# Patient Record
Sex: FEMALE | Race: WHITE | NOT HISPANIC OR LATINO | Employment: UNEMPLOYED | URBAN - METROPOLITAN AREA
[De-identification: names, ages, dates, MRNs, and addresses within clinical notes are randomized per-mention and may not be internally consistent; named-entity substitution may affect disease eponyms.]

---

## 2024-09-12 ENCOUNTER — LAB (OUTPATIENT)
Dept: INTERNAL MEDICINE | Facility: CLINIC | Age: 54
End: 2024-09-12
Payer: COMMERCIAL

## 2024-09-12 ENCOUNTER — OFFICE VISIT (OUTPATIENT)
Dept: INTERNAL MEDICINE | Facility: CLINIC | Age: 54
End: 2024-09-12
Payer: COMMERCIAL

## 2024-09-12 VITALS
TEMPERATURE: 97.8 F | SYSTOLIC BLOOD PRESSURE: 122 MMHG | HEART RATE: 72 BPM | HEIGHT: 62 IN | DIASTOLIC BLOOD PRESSURE: 68 MMHG | BODY MASS INDEX: 19.32 KG/M2 | WEIGHT: 105 LBS | RESPIRATION RATE: 16 BRPM | OXYGEN SATURATION: 99 %

## 2024-09-12 DIAGNOSIS — Z00.00 PHYSICAL EXAM: Primary | ICD-10-CM

## 2024-09-12 DIAGNOSIS — R22.0 HEAD MASS: ICD-10-CM

## 2024-09-12 DIAGNOSIS — J30.2 SEASONAL ALLERGIES: ICD-10-CM

## 2024-09-12 DIAGNOSIS — Z12.31 ENCOUNTER FOR SCREENING MAMMOGRAM FOR MALIGNANT NEOPLASM OF BREAST: ICD-10-CM

## 2024-09-12 DIAGNOSIS — R63.4 UNINTENTIONAL WEIGHT LOSS: ICD-10-CM

## 2024-09-12 DIAGNOSIS — Z00.00 ANNUAL PHYSICAL EXAM: Primary | ICD-10-CM

## 2024-09-12 DIAGNOSIS — Z11.59 NEED FOR HEPATITIS C SCREENING TEST: ICD-10-CM

## 2024-09-12 DIAGNOSIS — F33.1 MAJOR DEPRESSIVE DISORDER, RECURRENT, MODERATE: ICD-10-CM

## 2024-09-12 DIAGNOSIS — Z11.51 SCREENING FOR HPV (HUMAN PAPILLOMAVIRUS): ICD-10-CM

## 2024-09-12 DIAGNOSIS — Z12.12 SCREENING FOR COLORECTAL CANCER: ICD-10-CM

## 2024-09-12 DIAGNOSIS — E78.2 MIXED HYPERLIPIDEMIA: ICD-10-CM

## 2024-09-12 DIAGNOSIS — Z12.11 SCREENING FOR COLORECTAL CANCER: ICD-10-CM

## 2024-09-12 DIAGNOSIS — E55.9 VITAMIN D DEFICIENCY: ICD-10-CM

## 2024-09-12 PROCEDURE — 99386 PREV VISIT NEW AGE 40-64: CPT | Performed by: INTERNAL MEDICINE

## 2024-09-12 PROCEDURE — 86803 HEPATITIS C AB TEST: CPT | Performed by: INTERNAL MEDICINE

## 2024-09-12 PROCEDURE — 82607 VITAMIN B-12: CPT | Performed by: INTERNAL MEDICINE

## 2024-09-12 PROCEDURE — 83036 HEMOGLOBIN GLYCOSYLATED A1C: CPT | Performed by: INTERNAL MEDICINE

## 2024-09-12 PROCEDURE — 36415 COLL VENOUS BLD VENIPUNCTURE: CPT | Performed by: INTERNAL MEDICINE

## 2024-09-12 PROCEDURE — 80050 GENERAL HEALTH PANEL: CPT | Performed by: INTERNAL MEDICINE

## 2024-09-12 PROCEDURE — 81001 URINALYSIS AUTO W/SCOPE: CPT | Performed by: INTERNAL MEDICINE

## 2024-09-12 PROCEDURE — 80061 LIPID PANEL: CPT | Performed by: INTERNAL MEDICINE

## 2024-09-12 PROCEDURE — 82306 VITAMIN D 25 HYDROXY: CPT | Performed by: INTERNAL MEDICINE

## 2024-09-12 PROCEDURE — G0432 EIA HIV-1/HIV-2 SCREEN: HCPCS | Performed by: INTERNAL MEDICINE

## 2024-09-12 RX ORDER — MIRTAZAPINE 7.5 MG/1
7.5 TABLET, FILM COATED ORAL NIGHTLY
Start: 2024-09-12

## 2024-09-12 RX ORDER — CLONIDINE HYDROCHLORIDE 0.1 MG/1
0.1 TABLET ORAL 2 TIMES DAILY
COMMUNITY
End: 2024-09-12

## 2024-09-12 RX ORDER — MIRTAZAPINE 15 MG/1
15 TABLET, FILM COATED ORAL NIGHTLY
COMMUNITY
End: 2024-09-12

## 2024-09-12 RX ORDER — MONTELUKAST SODIUM 10 MG/1
10 TABLET ORAL NIGHTLY
Qty: 30 TABLET | Refills: 5 | Status: SHIPPED | OUTPATIENT
Start: 2024-09-12

## 2024-09-12 RX ORDER — ERGOCALCIFEROL 1.25 MG/1
50000 CAPSULE, LIQUID FILLED ORAL WEEKLY
Start: 2024-09-12
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
## 2024-09-13 ENCOUNTER — PATIENT ROUNDING (BHMG ONLY) (OUTPATIENT)
Dept: INTERNAL MEDICINE | Facility: CLINIC | Age: 54
End: 2024-09-13
Payer: COMMERCIAL

## 2024-09-13 LAB
25(OH)D3 SERPL-MCNC: 47.6 NG/ML (ref 30–100)
ALBUMIN SERPL-MCNC: 4.2 G/DL (ref 3.5–5.2)
ALBUMIN/GLOB SERPL: 1.6 G/DL
ALP SERPL-CCNC: 162 U/L (ref 39–117)
ALT SERPL W P-5'-P-CCNC: 48 U/L (ref 1–33)
ANION GAP SERPL CALCULATED.3IONS-SCNC: 9.5 MMOL/L (ref 5–15)
AST SERPL-CCNC: 31 U/L (ref 1–32)
BACTERIA UR QL AUTO: NORMAL /HPF
BASOPHILS # BLD AUTO: 0.07 10*3/MM3 (ref 0–0.2)
BASOPHILS NFR BLD AUTO: 1.2 % (ref 0–1.5)
BILIRUB SERPL-MCNC: 0.2 MG/DL (ref 0–1.2)
BILIRUB UR QL STRIP: NEGATIVE
BUN SERPL-MCNC: 18 MG/DL (ref 6–20)
BUN/CREAT SERPL: 21.7 (ref 7–25)
CALCIUM SPEC-SCNC: 9.8 MG/DL (ref 8.6–10.5)
CHLORIDE SERPL-SCNC: 103 MMOL/L (ref 98–107)
CHOLEST SERPL-MCNC: 214 MG/DL (ref 0–200)
CLARITY UR: CLEAR
CO2 SERPL-SCNC: 24.5 MMOL/L (ref 22–29)
COLOR UR: YELLOW
CREAT SERPL-MCNC: 0.83 MG/DL (ref 0.57–1)
DEPRECATED RDW RBC AUTO: 44.4 FL (ref 37–54)
EGFRCR SERPLBLD CKD-EPI 2021: 83.4 ML/MIN/1.73
EOSINOPHIL # BLD AUTO: 0.19 10*3/MM3 (ref 0–0.4)
EOSINOPHIL NFR BLD AUTO: 3.3 % (ref 0.3–6.2)
ERYTHROCYTE [DISTWIDTH] IN BLOOD BY AUTOMATED COUNT: 13.1 % (ref 12.3–15.4)
GLOBULIN UR ELPH-MCNC: 2.6 GM/DL
GLUCOSE SERPL-MCNC: 75 MG/DL (ref 65–99)
GLUCOSE UR STRIP-MCNC: NEGATIVE MG/DL
HBA1C MFR BLD: 4.9 % (ref 4.8–5.6)
HCT VFR BLD AUTO: 42.3 % (ref 34–46.6)
HCV AB SER QL: NORMAL
HDLC SERPL-MCNC: 94 MG/DL (ref 40–60)
HGB BLD-MCNC: 13.1 G/DL (ref 12–15.9)
HGB UR QL STRIP.AUTO: NEGATIVE
HIV 1+2 AB+HIV1 P24 AG SERPL QL IA: NORMAL
HYALINE CASTS UR QL AUTO: NORMAL /LPF
IMM GRANULOCYTES # BLD AUTO: 0.01 10*3/MM3 (ref 0–0.05)
IMM GRANULOCYTES NFR BLD AUTO: 0.2 % (ref 0–0.5)
KETONES UR QL STRIP: NEGATIVE
LDLC SERPL CALC-MCNC: 109 MG/DL (ref 0–100)
LDLC/HDLC SERPL: 1.14 {RATIO}
LEUKOCYTE ESTERASE UR QL STRIP.AUTO: NEGATIVE
LYMPHOCYTES # BLD AUTO: 1.23 10*3/MM3 (ref 0.7–3.1)
LYMPHOCYTES NFR BLD AUTO: 21.4 % (ref 19.6–45.3)
MCH RBC QN AUTO: 28.9 PG (ref 26.6–33)
MCHC RBC AUTO-ENTMCNC: 31 G/DL (ref 31.5–35.7)
MCV RBC AUTO: 93.2 FL (ref 79–97)
MONOCYTES # BLD AUTO: 0.47 10*3/MM3 (ref 0.1–0.9)
MONOCYTES NFR BLD AUTO: 8.2 % (ref 5–12)
NEUTROPHILS NFR BLD AUTO: 3.77 10*3/MM3 (ref 1.7–7)
NEUTROPHILS NFR BLD AUTO: 65.7 % (ref 42.7–76)
NITRITE UR QL STRIP: NEGATIVE
NRBC BLD AUTO-RTO: 0 /100 WBC (ref 0–0.2)
PH UR STRIP.AUTO: 6.5 [PH] (ref 5–8)
PLATELET # BLD AUTO: 278 10*3/MM3 (ref 140–450)
PMV BLD AUTO: 10.9 FL (ref 6–12)
POTASSIUM SERPL-SCNC: 5 MMOL/L (ref 3.5–5.2)
PROT SERPL-MCNC: 6.8 G/DL (ref 6–8.5)
PROT UR QL STRIP: NEGATIVE
RBC # BLD AUTO: 4.54 10*6/MM3 (ref 3.77–5.28)
RBC # UR STRIP: NORMAL /HPF
REF LAB TEST METHOD: NORMAL
SODIUM SERPL-SCNC: 137 MMOL/L (ref 136–145)
SP GR UR STRIP: 1.02 (ref 1–1.03)
SQUAMOUS #/AREA URNS HPF: NORMAL /HPF
TRIGL SERPL-MCNC: 63 MG/DL (ref 0–150)
TSH SERPL DL<=0.05 MIU/L-ACNC: 2.13 UIU/ML (ref 0.27–4.2)
UROBILINOGEN UR QL STRIP: NORMAL
VIT B12 BLD-MCNC: 517 PG/ML (ref 211–946)
VLDLC SERPL-MCNC: 11 MG/DL (ref 5–40)
WBC # UR STRIP: NORMAL /HPF
WBC NRBC COR # BLD AUTO: 5.74 10*3/MM3 (ref 3.4–10.8)

## 2024-09-13 RX ORDER — CHLORAL HYDRATE 500 MG
2000 CAPSULE ORAL 2 TIMES DAILY WITH MEALS
Qty: 120 CAPSULE | Refills: 3 | Status: SHIPPED | OUTPATIENT
Start: 2024-09-13

## 2024-09-13 NOTE — PROGRESS NOTES
A  my chart message has been sent to the patient for patient rounding with [facility].

## 2024-09-26 ENCOUNTER — TELEPHONE (OUTPATIENT)
Dept: INTERNAL MEDICINE | Facility: CLINIC | Age: 54
End: 2024-09-26
Payer: COMMERCIAL

## 2024-09-30 ENCOUNTER — TELEPHONE (OUTPATIENT)
Dept: INTERNAL MEDICINE | Facility: CLINIC | Age: 54
End: 2024-09-30
Payer: COMMERCIAL

## 2024-09-30 DIAGNOSIS — R05.3 CHRONIC COUGH: Primary | ICD-10-CM

## 2024-09-30 NOTE — TELEPHONE ENCOUNTER
Patient's sister called back, message regarding the CT scans and x-ray was relayed. She has a question regarding guardianship paperwork, she needs a physician to fill out a portion and she wanted to know if Dr Bolanos could help with that. Please advise.

## 2024-09-30 NOTE — TELEPHONE ENCOUNTER
Vane with [employer] called to let provider know that all 3 CTs were denied for different reasons. The denial letters were scanned into her chart. She needs to know if its okay to cancel the sandra scheduled on 10/4.

## 2024-10-03 RX ORDER — SODIUM PICOSULFATE, MAGNESIUM OXIDE, AND ANHYDROUS CITRIC ACID 12; 3.5; 1 G/175ML; G/175ML; MG/175ML
350 LIQUID ORAL TAKE AS DIRECTED
Qty: 350 ML | Refills: 0 | Status: SHIPPED | OUTPATIENT
Start: 2024-10-03 | End: 2024-10-04

## 2024-10-10 ENCOUNTER — HOSPITAL ENCOUNTER (OUTPATIENT)
Dept: MAMMOGRAPHY | Facility: HOSPITAL | Age: 54
Discharge: HOME | End: 2024-10-10
Admitting: INTERNAL MEDICINE
Payer: COMMERCIAL

## 2024-10-10 ENCOUNTER — LAB (OUTPATIENT)
Dept: INTERNAL MEDICINE | Facility: CLINIC | Age: 54
End: 2024-10-10
Payer: COMMERCIAL

## 2024-10-10 ENCOUNTER — OFFICE VISIT (OUTPATIENT)
Dept: INTERNAL MEDICINE | Facility: CLINIC | Age: 54
End: 2024-10-10
Payer: COMMERCIAL

## 2024-10-10 VITALS
BODY MASS INDEX: 22.63 KG/M2 | OXYGEN SATURATION: 99 % | RESPIRATION RATE: 16 BRPM | HEART RATE: 84 BPM | TEMPERATURE: 97.6 F | WEIGHT: 123 LBS | DIASTOLIC BLOOD PRESSURE: 78 MMHG | HEIGHT: 62 IN | SYSTOLIC BLOOD PRESSURE: 126 MMHG

## 2024-10-10 DIAGNOSIS — R74.8 ELEVATED LIVER ENZYMES: ICD-10-CM

## 2024-10-10 DIAGNOSIS — F33.1 MAJOR DEPRESSIVE DISORDER, RECURRENT, MODERATE: ICD-10-CM

## 2024-10-10 DIAGNOSIS — K21.9 GASTROESOPHAGEAL REFLUX DISEASE WITHOUT ESOPHAGITIS: ICD-10-CM

## 2024-10-10 DIAGNOSIS — E44.0 MODERATE PROTEIN-CALORIE MALNUTRITION: Primary | ICD-10-CM

## 2024-10-10 LAB
NCCN CRITERIA FLAG: NORMAL
TYRER CUZICK SCORE: 4.6

## 2024-10-10 PROCEDURE — 90471 IMMUNIZATION ADMIN: CPT | Performed by: INTERNAL MEDICINE

## 2024-10-10 PROCEDURE — 80053 COMPREHEN METABOLIC PANEL: CPT | Performed by: INTERNAL MEDICINE

## 2024-10-10 PROCEDURE — 90656 IIV3 VACC NO PRSV 0.5 ML IM: CPT | Performed by: INTERNAL MEDICINE

## 2024-10-10 PROCEDURE — 77063 BREAST TOMOSYNTHESIS BI: CPT

## 2024-10-10 PROCEDURE — 99214 OFFICE O/P EST MOD 30 MIN: CPT | Performed by: INTERNAL MEDICINE

## 2024-10-10 PROCEDURE — 77067 SCR MAMMO BI INCL CAD: CPT

## 2024-10-10 RX ORDER — FAMOTIDINE 20 MG/1
20 TABLET, FILM COATED ORAL 2 TIMES DAILY PRN
Qty: 60 TABLET | Refills: 3 | Status: SHIPPED | OUTPATIENT
Start: 2024-10-10

## 2024-10-10 NOTE — PROGRESS NOTES
Follow Up Office Visit      Date: 10/10/2024   Patient Name: Dali Batista  : 1970   MRN: [id number]     Chief Complaint:    Chief Complaint   Patient presents with    Follow-up       History of Present Illness: Dali Batista is a 54 y.o. female who is here today to follow up with moderate protein calorie malnutrition.  She has now been on a better diet and has gained a significant amount of weight back.  She also has soft tissue mass on the back of her head.  She was referred to general surgery for this.      Subjective      Past Medical History:   Diagnosis Date    Depression        Past Surgical History:   Procedure Laterality Date     SECTION         Family History   Problem Relation Age of Onset    Diabetes Mother     Hypertension Mother     Hyperlipidemia Mother     Osteoporosis Mother     Stroke Mother     Heart attack Father     Hypertension Father     Thyroid disease Maternal Grandmother     Cancer Paternal Grandmother         Social History     Socioeconomic History    Marital status: Single   Tobacco Use    Smoking status: Never    Smokeless tobacco: Never   Vaping Use    Vaping status: Never Used   Substance and Sexual Activity    Alcohol use: Not Currently    Drug use: Never    Sexual activity: Defer         I have reviewed the patients family history, social history, past medical history, past surgical history and have updated it as appropriate.     Medications:     Current Outpatient Medications:     LORATADINE ALLERGY RELIEF PO, Take 10 mg by mouth Daily., Disp: , Rfl:     montelukast (SINGULAIR) 10 MG tablet, Take 1 tablet by mouth Every Night., Disp: 30 tablet, Rfl: 5    Omega-3 Fatty Acids (fish oil) 1000 MG capsule capsule, Take 2 capsules by mouth 2 (Two) Times a Day With Meals., Disp: 120 capsule, Rfl: 3    sertraline (ZOLOFT) 50 MG tablet, Take 1 tablet by mouth Daily., Disp: , Rfl:     vitamin D (ERGOCALCIFEROL) 1.25 MG (67823 UT) capsule capsule, Take 1 capsule by  "mouth 1 (One) Time Per Week., Disp: , Rfl:     famotidine (Pepcid) 20 MG tablet, Take 1 tablet by mouth 2 (Two) Times a Day As Needed for Heartburn., Disp: 60 tablet, Rfl: 3    Allergies:   No Known Allergies    Objective       Vital Signs:   Vitals:    10/10/24 0931   BP: 126/78   BP Location: Left arm   Patient Position: Sitting   Cuff Size: Adult   Pulse: 84   Resp: 16   Temp: 97.6 °F (36.4 °C)   SpO2: 99%   Weight: 55.8 kg (123 lb)   Height: 157.5 cm (62\")     Body mass index is 22.5 kg/m².    Physical Exam:  Physical Exam  Constitutional:       General: She is not in acute distress.     Appearance: Normal appearance. She is not ill-appearing.   HENT:      Nose: No congestion or rhinorrhea.      Mouth/Throat:      Mouth: Mucous membranes are moist.      Pharynx: No oropharyngeal exudate.   Eyes:      Extraocular Movements: Extraocular movements intact.      Conjunctiva/sclera: Conjunctivae normal.      Pupils: Pupils are equal, round, and reactive to light.   Cardiovascular:      Rate and Rhythm: Normal rate and regular rhythm.   Pulmonary:      Effort: Pulmonary effort is normal. No respiratory distress.      Breath sounds: Normal breath sounds.   Abdominal:      General: Abdomen is flat. Bowel sounds are normal.      Palpations: Abdomen is soft.      Tenderness: There is no abdominal tenderness.   Musculoskeletal:         General: No swelling.      Cervical back: Neck supple.      Right lower leg: No edema.      Left lower leg: No edema.   Skin:     Coloration: Skin is not jaundiced.      Findings: No rash.   Neurological:      General: No focal deficit present.      Mental Status: She is alert and oriented to person, place, and time.      Cranial Nerves: No cranial nerve deficit.   Psychiatric:         Mood and Affect: Mood normal.         Behavior: Behavior normal.         Thought Content: Thought content normal.         Procedures    Results:       Assessment / Plan      Assessment/Plan:   Diagnoses and all " orders for this visit:    1. Moderate protein-calorie malnutrition (Primary)    2. Major depressive disorder, recurrent, moderate    3. Elevated liver enzymes  -     Comprehensive metabolic panel    4. Gastroesophageal reflux disease without esophagitis  -     famotidine (Pepcid) 20 MG tablet; Take 1 tablet by mouth 2 (Two) Times a Day As Needed for Heartburn.  Dispense: 60 tablet; Refill: 3    Other orders  -     Fluzone >6mos (3131-3266)       Continue sertraline for depression. She has been binge eating.  Stop mirtazapine.      Has general surgery referral on 15th at 8:45 for superficial head mass      Recheck liver enzymes          BMI is within normal parameters. No other follow-up for BMI required.       Follow Up:   Return in about 6 months (around 4/10/2025) for Recheck.    DO SHAMAR Aviles

## 2024-10-11 ENCOUNTER — HOSPITAL ENCOUNTER (OUTPATIENT)
Dept: GENERAL RADIOLOGY | Facility: HOSPITAL | Age: 54
Discharge: HOME | End: 2024-10-11
Payer: COMMERCIAL

## 2024-10-11 DIAGNOSIS — R05.3 CHRONIC COUGH: ICD-10-CM

## 2024-10-11 LAB
ALBUMIN SERPL-MCNC: 4 G/DL (ref 3.5–5.2)
ALBUMIN/GLOB SERPL: 1.4 G/DL
ALP SERPL-CCNC: 157 U/L (ref 39–117)
ALT SERPL W P-5'-P-CCNC: 20 U/L (ref 1–33)
ANION GAP SERPL CALCULATED.3IONS-SCNC: 9 MMOL/L (ref 5–15)
AST SERPL-CCNC: 23 U/L (ref 1–32)
BILIRUB SERPL-MCNC: 0.3 MG/DL (ref 0–1.2)
BUN SERPL-MCNC: 23 MG/DL (ref 6–20)
BUN/CREAT SERPL: 32.4 (ref 7–25)
CALCIUM SPEC-SCNC: 9.6 MG/DL (ref 8.6–10.5)
CHLORIDE SERPL-SCNC: 104 MMOL/L (ref 98–107)
CO2 SERPL-SCNC: 26 MMOL/L (ref 22–29)
CREAT SERPL-MCNC: 0.71 MG/DL (ref 0.57–1)
EGFRCR SERPLBLD CKD-EPI 2021: 100.6 ML/MIN/1.73
GLOBULIN UR ELPH-MCNC: 2.8 GM/DL
GLUCOSE SERPL-MCNC: 85 MG/DL (ref 65–99)
POTASSIUM SERPL-SCNC: 4.7 MMOL/L (ref 3.5–5.2)
PROT SERPL-MCNC: 6.8 G/DL (ref 6–8.5)
SODIUM SERPL-SCNC: 139 MMOL/L (ref 136–145)

## 2024-10-11 PROCEDURE — 71046 X-RAY EXAM CHEST 2 VIEWS: CPT

## 2024-10-28 ENCOUNTER — HOSPITAL ENCOUNTER (OUTPATIENT)
Dept: NUTRITION | Facility: HOSPITAL | Age: 54
Setting detail: RECURRING SERIES
Discharge: HOME | End: 2024-10-28
Payer: COMMERCIAL

## 2024-10-28 PROCEDURE — 97802 MEDICAL NUTRITION INDIV IN: CPT
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
## 2024-10-31 ENCOUNTER — OUTSIDE FACILITY SERVICE (OUTPATIENT)
Dept: GASTROENTEROLOGY | Facility: CLINIC | Age: 54
End: 2024-10-31
Payer: COMMERCIAL

## 2024-10-31 PROCEDURE — 45378 DIAGNOSTIC COLONOSCOPY: CPT | Performed by: INTERNAL MEDICINE

## 2024-11-14 ENCOUNTER — TELEMEDICINE (OUTPATIENT)
Dept: PSYCHIATRY | Facility: CLINIC | Age: 54
End: 2024-11-14
Payer: COMMERCIAL

## 2024-11-14 ENCOUNTER — TELEPHONE (OUTPATIENT)
Dept: PSYCHIATRY | Facility: CLINIC | Age: 54
End: 2024-11-14

## 2024-11-14 DIAGNOSIS — F95.8 VOCAL TIC DISORDER: ICD-10-CM

## 2024-11-14 DIAGNOSIS — F33.1 MAJOR DEPRESSIVE DISORDER, RECURRENT, MODERATE: Primary | ICD-10-CM

## 2024-11-14 DIAGNOSIS — F41.1 GENERALIZED ANXIETY DISORDER: ICD-10-CM

## 2024-11-14 DIAGNOSIS — F70 INTELLECTUAL DEVELOPMENTAL DISORDER, MILD: ICD-10-CM

## 2024-11-14 NOTE — PROGRESS NOTES
"Mode of Visit: Video  Location of patient: -HOME-  Location of provider: +HOME+  You have chosen to receive care through a telehealth visit.  The patient has signed the video visit consent form.  The visit included audio and video interaction. No technical issues occurred during this visit.  This provider is located at [facility], [address] [place] [address], [facility], [id number] using a secure [employer] Video Visit through [employer]. Patient is being seen remotely via telehealth at their home address in [place], and stated they are in a secure environment for this session. The patient's condition being diagnosed/treated is appropriate for telemedicine. The provider identified herself as well as her credentials.   The patient, and/or patients guardian, consent to be seen remotely, and when consent is given they understand that the consent allows for patient identifiable information to be sent to a third party as needed.   They may refuse to be seen remotely at any time. The electronic data is encrypted and password protected, and the patient and/or guardian has been advised of the potential risks to privacy not withstanding such measures.   PT Identifiers used: Name and .    Justification for 10432  Included Complexity Code CPT 37614 Due to: Need to Manage Communication of Participants    You have chosen to receive care through a telehealth visit.  Do you consent to use a video/audio connection for your medical care today? Yes      Subjective   Dali Batista is a 54 y.o. female who presents today for initial evaluation   She presents with her sister, Anaid, who she lives with.  There is a signed release on file.    Chief Complaint:  \" Anxiety, depression, intellectual disability, vocal tic\"    History of Present Illness:     History of Present Illness  Patient presented to appointment a little bit early, she is currently living with her sister who is going through the proper channels to become the " "patient's  guardian.  Anaid and patient both expressed frustration with the avenues that they have to go through.  There no medical records available for when she was a child as these have been destroyed.  However, Anaid is reaching out to the [employer] because her understanding is they keep records for ever.  And patient may have been seen on a  base for medical care at some point.  Also encouraged Anaid to reach out to [place] to senators and representatives to see if they can give some assistance.    Patient moved to [place] from [facility] where she was living with her daughter and her daughter's ex-.  Both of the ladies reported that the patient was ill treated and was only given Ramen noodles to eat.  Anaid reported that the patient weighed less than 100 pounds when she came to live with Anaid.  Anaid reports that the patient had psychological assessment at [facility] in [facility] and reported full scale IQ was measured at 63.  This assessment was during the summer 2024.  Anaid reports that patient was in special needs classes in schools, their father was lifelong  in the [employer] and they lived in [place] at that time.  Previous mental health services were through [facility] in [facility].  Patient has a noticeable vocal tic where she clears her throat and this has been ongoing for many many years.  I did discuss possibly starting Abilify to see if this would help but they really do not want to be on another medication, and I have to agree to a certain extent as Abilify comes with many side effects.  Current PHQ-9 is scored at 0 and current LAURENT-7 is scored at 4.  Patient has been on sertraline now at 50 mg daily for some time.  Was also on Remeron but was having a lot of eating on this, and also was on clonidine \"as needed\".  Patient would like referral for therapy so this was " placed.       Patient Health Questionnaire-9 (PHQ-9) (Depression Screening Tool)  Little interest or pleasure in doing things? (Patient-Rptd) Not at all   Feeling down, depressed, or hopeless? (Patient-Rptd) Not at all   PHQ-2 Total Score (Patient-Rptd) 0   Trouble falling or staying asleep, or sleeping too much? (Patient-Rptd) Not at all   Feeling tired or having little energy? (Patient-Rptd) Not at all   Poor appetite or overeating? (Patient-Rptd) Not at all   Feeling bad about yourself - or that you are a failure or have let yourself or your family down? (Patient-Rptd) Not at all   Trouble concentrating on things, such as reading the newspaper or watching television? (Patient-Rptd) Not at all   Moving or speaking so slowly that other people could have noticed? Or the opposite - being so fidgety or restless that you have been moving around a lot more than usual? (Patient-Rptd) Not at all   Thoughts that you would be better off dead, or of hurting yourself in some way? (Patient-Rptd) Not at all   PHQ-9 Total Score (Patient-Rptd) 0   If you checked off any problems, how difficult have these problems made it for you to do your work, take care of things at home, or get along with other people? (Patient-Rptd) Not difficult at all         PHQ-9 Total Score: (Patient-Rptd) 0       Generalized Anxiety Disorder 7-Item (LAURENT-7) Screening Tool  Feeling nervous, anxious or on edge: (Patient-Rptd) Several days  Not being able to stop or control worrying: (Patient-Rptd) Not at all  Worrying too much about different things: (Patient-Rptd) Several days  Trouble Relaxing: (Patient-Rptd) Several days  Being so restless that it is hard to sit still: (Patient-Rptd) Several days  Feeling afraid as if something awful might happen: (Patient-Rptd) Not at all  Becoming easily annoyed or irritable: (Patient-Rptd) Not at all  LAURENT 7 Total Score: (Patient-Rptd) 4  If you checked any problems, how difficult have these problems made it for you  to do your work, take care of things at home, or get along with other people: (Patient-Rptd) Not difficult at all         The following portions of the patient's history were reviewed and updated as appropriate: allergies, current medications, past family history, past medical history, past social history, past surgical history and problem list.    Past Psychiatric History:  Began Treatment: As a child patient reportedly was in special needs classes.  Diagnoses:Depression, Anxiety, and vocal tic disorder, mild intellectual disability, full scale IQ measured at 63 by psychological assessment at [facility] in [facility] in 2024  Psychiatrist: Most recently patient was treated at [facility] in [facility].  Saw a psychiatrist there virtually for medication management.  Therapist: Most recently patient was seeing a therapist named Jaclyn at [facility] in [facility].  Admission History: Patient reported she was admitted to [facility] inpatient unit after the death of her mother due to overwhelming grief.  Medication Trials: Remeron gave her increased appetite, clonidine (?), Zoloft at 50 mg as of initial encounter on 2024.  This medicine seems to be helping some.  Self Harm: Denies  Suicide Attempts:Denies   Psychosis, Anxiety, Depression: Denies  Patient denied any history of a head injury or seizure  Denied any history of hallucinations or psychosis    Past Medical History:  Past Medical History:   Diagnosis Date    Anxiety     Depression     Vocal tic disorder        Substance Abuse History:   Types:Denies all, including illicit       Social History:  Social History     Socioeconomic History    Marital status: Single    Number of children: 1    Highest education level: High school graduate   Tobacco Use    Smoking status: Never    Smokeless tobacco: Never   Vaping Use    Vaping status: Never Used   Substance and Sexual Activity    Alcohol use: Not  Currently    Drug use: Never    Sexual activity: Not Currently     Partners: Male   Current support system consists of her sister Anaid.  Patient is not currently employed but in the past she worked in fast food and at a warehouse.  She went through [employer] in the past to find work.    Family History:  Family History   Problem Relation Age of Onset    Hypertension Mother     Hyperlipidemia Mother     Osteoporosis Mother     Stroke Mother     Diabetes type II Mother     Heart attack Father     Hypertension Father     Thyroid disease Maternal Grandmother     Dementia Maternal Grandmother     Cancer Paternal Grandmother     Dementia Paternal Grandmother     Breast cancer Neg Hx     Ovarian cancer Neg Hx        Past Surgical History:  Past Surgical History:   Procedure Laterality Date     SECTION      FRACTURE SURGERY  over 10 years    fell and broke ankle/foot       Problem List:  There is no problem list on file for this patient.      Allergy:   No Known Allergies     Current Medications:   Current Outpatient Medications   Medication Sig Dispense Refill    famotidine (Pepcid) 20 MG tablet Take 1 tablet by mouth 2 (Two) Times a Day As Needed for Heartburn. 60 tablet 3    LORATADINE ALLERGY RELIEF PO Take 10 mg by mouth Daily.      montelukast (SINGULAIR) 10 MG tablet Take 1 tablet by mouth Every Night. 30 tablet 5    sertraline (ZOLOFT) 50 MG tablet Take one oral tablet at bedtime with food 90 tablet 3    vitamin D (ERGOCALCIFEROL) 1.25 MG (81972 UT) capsule capsule Take 1 capsule by mouth 1 (One) Time Per Week. (Patient taking differently: Take 1 capsule by mouth 1 (One) Time Per Week. Pt was taking this but reported she is taking OTC Vitamin D)       No current facility-administered medications for this visit.       Review of Systems:    Review of Systems   Psychiatric/Behavioral:  The patient is nervous/anxious.    All other systems reviewed and are negative.        Physical Exam:   Physical  Exam  Vitals reviewed.   Constitutional:       Appearance: Normal appearance. She is well-developed and well-groomed.   HENT:      Head: Normocephalic.   Neurological:      Mental Status: She is alert.   Psychiatric:         Attention and Perception: Attention and perception normal.         Mood and Affect: Affect normal. Mood is anxious.         Speech: Speech normal.         Behavior: Behavior normal. Behavior is cooperative.         Thought Content: Thought content normal.      Comments: Noticeable vocal tic of clearing her throat         Vitals:  There were no vitals taken for this visit. There is no height or weight on file to calculate BMI.  Due to extenuating circumstances and possible current health risks associated with the patient being present in a clinical setting (with current health restrictions in place in regards to possible COVID 19 transmission/exposure), the patient was seen remotely today via a MyChart Video Visit through [facility] and telephone encounter.  Unable to obtain vital signs due to nature of remote visit.  Height stated at 62 inches.  Weight stated at 123 pounds.    Last 3 Blood Pressure Readings:  BP Readings from Last 3 Encounters:   10/10/24 126/78   09/12/24 122/68            Mental Status Exam:   Hygiene:   good  Cooperation:  Cooperative  Eye Contact:  Good  Psychomotor Behavior:  Appropriate  Affect:  Full range  Mood: anxious  Hopelessness: Denies  Speech:  Normal  Thought Process:  Goal directed and Linear  Thought Content:  Normal  Suicidal:  None  Homicidal:  None  Hallucinations:  None  Delusion:  None  Memory:  Intact  Orientation:  Person, Place, Time, and Situation  Reliability:  good  Insight:  Good  Judgement:  Good  Impulse Control:  Good  Physical/Medical Issues:  No        Lab Results:   Orders Only on 10/10/2024   Component Date Value Ref Range Status    TyrerCuzick 10/10/2024 4.6   Final    NCCN 10/10/2024 NCCN not met   Final    High Risk Cancer Risk Assessment    Office Visit on 10/10/2024   Component Date Value Ref Range Status    Glucose 10/10/2024 85  65 - 99 mg/dL Final    BUN 10/10/2024 23 (H)  6 - 20 mg/dL Final    Creatinine 10/10/2024 0.71  0.57 - 1.00 mg/dL Final    Sodium 10/10/2024 139  136 - 145 mmol/L Final    Potassium 10/10/2024 4.7  3.5 - 5.2 mmol/L Final    Chloride 10/10/2024 104  98 - 107 mmol/L Final    CO2 10/10/2024 26.0  22.0 - 29.0 mmol/L Final    Calcium 10/10/2024 9.6  8.6 - 10.5 mg/dL Final    Total Protein 10/10/2024 6.8  6.0 - 8.5 g/dL Final    Albumin 10/10/2024 4.0  3.5 - 5.2 g/dL Final    ALT (SGPT) 10/10/2024 20  1 - 33 U/L Final    AST (SGOT) 10/10/2024 23  1 - 32 U/L Final    Alkaline Phosphatase 10/10/2024 157 (H)  39 - 117 U/L Final    Total Bilirubin 10/10/2024 0.3  0.0 - 1.2 mg/dL Final    Globulin 10/10/2024 2.8  gm/dL Final    A/G Ratio 10/10/2024 1.4  g/dL Final    BUN/Creatinine Ratio 10/10/2024 32.4 (H)  7.0 - 25.0 Final    Anion Gap 10/10/2024 9.0  5.0 - 15.0 mmol/L Final    eGFR 10/10/2024 100.6  >60.0 mL/min/1.73 Final   Office Visit on 09/12/2024   Component Date Value Ref Range Status    Hemoglobin A1C 09/12/2024 4.90  4.80 - 5.60 % Final    25 Hydroxy, Vitamin D 09/12/2024 47.6  30.0 - 100.0 ng/ml Final    Vitamin B-12 09/12/2024 517  211 - 946 pg/mL Final    Total Cholesterol 09/12/2024 214 (H)  0 - 200 mg/dL Final    Triglycerides 09/12/2024 63  0 - 150 mg/dL Final    HDL Cholesterol 09/12/2024 94 (H)  40 - 60 mg/dL Final    LDL Cholesterol  09/12/2024 109 (H)  0 - 100 mg/dL Final    VLDL Cholesterol 09/12/2024 11  5 - 40 mg/dL Final    LDL/HDL Ratio 09/12/2024 1.14   Final    Glucose 09/12/2024 75  65 - 99 mg/dL Final    BUN 09/12/2024 18  6 - 20 mg/dL Final    Creatinine 09/12/2024 0.83  0.57 - 1.00 mg/dL Final    Sodium 09/12/2024 137  136 - 145 mmol/L Final    Potassium 09/12/2024 5.0  3.5 - 5.2 mmol/L Final    Chloride 09/12/2024 103  98 - 107 mmol/L Final    CO2 09/12/2024 24.5  22.0 - 29.0 mmol/L Final    Calcium  09/12/2024 9.8  8.6 - 10.5 mg/dL Final    Total Protein 09/12/2024 6.8  6.0 - 8.5 g/dL Final    Albumin 09/12/2024 4.2  3.5 - 5.2 g/dL Final    ALT (SGPT) 09/12/2024 48 (H)  1 - 33 U/L Final    AST (SGOT) 09/12/2024 31  1 - 32 U/L Final    Alkaline Phosphatase 09/12/2024 162 (H)  39 - 117 U/L Final    Total Bilirubin 09/12/2024 0.2  0.0 - 1.2 mg/dL Final    Globulin 09/12/2024 2.6  gm/dL Final    A/G Ratio 09/12/2024 1.6  g/dL Final    BUN/Creatinine Ratio 09/12/2024 21.7  7.0 - 25.0 Final    Anion Gap 09/12/2024 9.5  5.0 - 15.0 mmol/L Final    eGFR 09/12/2024 83.4  >60.0 mL/min/1.73 Final    Hepatitis C Ab 09/12/2024 Non-Reactive  Non-Reactive Final    TSH 09/12/2024 2.130  0.270 - 4.200 uIU/mL Final    HIV DUO 09/12/2024 Non-Reactive  Non-Reactive Final    WBC 09/12/2024 5.74  3.40 - 10.80 10*3/mm3 Final    RBC 09/12/2024 4.54  3.77 - 5.28 10*6/mm3 Final    Hemoglobin 09/12/2024 13.1  12.0 - 15.9 g/dL Final    Hematocrit 09/12/2024 42.3  34.0 - 46.6 % Final    MCV 09/12/2024 93.2  79.0 - 97.0 fL Final    MCH 09/12/2024 28.9  26.6 - 33.0 pg Final    MCHC 09/12/2024 31.0 (L)  31.5 - 35.7 g/dL Final    RDW 09/12/2024 13.1  12.3 - 15.4 % Final    RDW-SD 09/12/2024 44.4  37.0 - 54.0 fl Final    MPV 09/12/2024 10.9  6.0 - 12.0 fL Final    Platelets 09/12/2024 278  140 - 450 10*3/mm3 Final    Neutrophil % 09/12/2024 65.7  42.7 - 76.0 % Final    Lymphocyte % 09/12/2024 21.4  19.6 - 45.3 % Final    Monocyte % 09/12/2024 8.2  5.0 - 12.0 % Final    Eosinophil % 09/12/2024 3.3  0.3 - 6.2 % Final    Basophil % 09/12/2024 1.2  0.0 - 1.5 % Final    Immature Grans % 09/12/2024 0.2  0.0 - 0.5 % Final    Neutrophils, Absolute 09/12/2024 3.77  1.70 - 7.00 10*3/mm3 Final    Lymphocytes, Absolute 09/12/2024 1.23  0.70 - 3.10 10*3/mm3 Final    Monocytes, Absolute 09/12/2024 0.47  0.10 - 0.90 10*3/mm3 Final    Eosinophils, Absolute 09/12/2024 0.19  0.00 - 0.40 10*3/mm3 Final    Basophils, Absolute 09/12/2024 0.07  0.00 - 0.20 10*3/mm3  Final    Immature Grans, Absolute 09/12/2024 0.01  0.00 - 0.05 10*3/mm3 Final    nRBC 09/12/2024 0.0  0.0 - 0.2 /100 WBC Final    Color, UA 09/12/2024 Yellow  Yellow, Straw Final    Appearance, UA 09/12/2024 Clear  Clear Final    pH, UA 09/12/2024 6.5  5.0 - 8.0 Final    Specific Gravity, UA 09/12/2024 1.022  1.005 - 1.030 Final    Glucose, UA 09/12/2024 Negative  Negative Final    Ketones, UA 09/12/2024 Negative  Negative Final    Bilirubin, UA 09/12/2024 Negative  Negative Final    Blood, UA 09/12/2024 Negative  Negative Final    Protein, UA 09/12/2024 Negative  Negative Final    Leuk Esterase, UA 09/12/2024 Negative  Negative Final    Nitrite, UA 09/12/2024 Negative  Negative Final    Urobilinogen, UA 09/12/2024 0.2 E.U./dL  0.2 - 1.0 E.U./dL Final    RBC, UA 09/12/2024 0-2  None Seen, 0-2 /HPF Final    WBC, UA 09/12/2024 0-2  None Seen, 0-2 /HPF Final    Bacteria, UA 09/12/2024 None Seen  None Seen /HPF Final    Squamous Epithelial Cells, UA 09/12/2024 0-2  None Seen, 0-2 /HPF Final    Hyaline Casts, UA 09/12/2024 None Seen  None Seen /LPF Final    Methodology 09/12/2024 Automated Microscopy   Final              Assessment & Plan   Diagnoses and all orders for this visit:    1. Major depressive disorder, recurrent, moderate (Primary)  -     sertraline (ZOLOFT) 50 MG tablet; Take one oral tablet at bedtime with food  Dispense: 90 tablet; Refill: 3  -     Ambulatory Referral to Behavioral Health    2. Generalized anxiety disorder  -     Ambulatory Referral to Behavioral Health    3. Intellectual developmental disorder, mild    4. Vocal tic disorder        Visit Diagnoses:    ICD-10-CM ICD-9-CM   1. Major depressive disorder, recurrent, moderate  F33.1 296.32   2. Generalized anxiety disorder  F41.1 300.02   3. Intellectual developmental disorder, mild  F70 317   4. Vocal tic disorder  F95.8 307.20         GOALS:  Short Term Goals: Patient will be compliant with medication, and patient will have no significant  "medication related side effects.  Patient will be engaged in psychotherapy as indicated.  Patient will report subjective improvement of symptoms.  Long term goals: To stabilize mood and treat/improve subjective symptoms, the patient will stay out of the hospital, the patient will be at an optimal level of functioning, and the patient will take all medications as prescribed.  The patient/guardian verbalized understanding and agreement with goals that were mutually set.      RISK ASSESSMENT  Current harm-to-self risk is reported by pt as \"none.\"  Current [id number]-[id number] risk is reported by pt as \"none.\"   No suicidal thoughts, intent, plan is appreciated by this provider on this date of exam.   No homicidal thoughts, intent, plan is appreciated by this provider on this date.      TREATMENT PLAN: Continue supportive psychotherapy efforts and medications as indicated.   Pharmacological and Non-Pharmacological treatment options discussed during today's visit. Patient/Guardian acknowledged and verbally consented with current treatment plan and was educated on the importance of compliance with treatment and follow-up appointments.      MEDICATION ISSUES:  Discussed medication options and treatment plan of prescribed medication as well as the risks, benefits, any black box warnings, and side effects including potential falls, possible impaired driving, and metabolic adversities among others. Patient is agreeable to call the office with any worsening of symptoms or onset of side effects, or if any concerns or questions arise.  The contact information for the office is made available to the patient. Patient is agreeable to call 911 or go to the nearest ER should they begin having any SI/HI, or if any urgent concerns arise. No medication side effects or related complaints today.     Continue with Zoloft 50 mg tablet take 1 oral tablet at bedtime with food  Referral made for therapy  Upload psychological assessment to " MyChart    MEDS ORDERED DURING VISIT:  New Medications Ordered This Visit   Medications    sertraline (ZOLOFT) 50 MG tablet     Sig: Take one oral tablet at bedtime with food     Dispense:  90 tablet     Refill:  3       Follow Up Appointment:   Return in about 2 months (around 1/14/2025) for Recheck, Video visit.               This document has been electronically signed by ARTURO Valdes  November 14, 2024 09:54 EST    Dictated Utilizing Dragon Dictation: Part of this note may be an electronic transcription/translation of spoken language to printed text using the Dragon Dictation System.

## 2024-11-14 NOTE — TELEPHONE ENCOUNTER
Requested that  email psychological assessment to our office to upload into pt chart per provider recommendation. Sister verbalized understanding. Will upload documents into pt's chart when received.

## 2024-11-14 NOTE — PATIENT INSTRUCTIONS
For concerns or needing assistance call the Behavioral Health [facility] at [phone]  Continue with Zoloft 50 mg tablet take 1 oral tablet at bedtime with food  Referral made for therapy  Upload psychological assessment to [place]

## 2024-11-21 ENCOUNTER — TELEPHONE (OUTPATIENT)
Dept: NUTRITION | Facility: HOSPITAL | Age: 54
End: 2024-11-21
Payer: COMMERCIAL

## 2024-12-18 ENCOUNTER — TELEPHONE (OUTPATIENT)
Dept: PSYCHIATRY | Facility: CLINIC | Age: 54
End: 2024-12-18
Payer: COMMERCIAL

## 2024-12-18 NOTE — TELEPHONE ENCOUNTER
Patient's sister called back and states she will have the court to send over the paperwork for provider to look at.

## 2024-12-18 NOTE — TELEPHONE ENCOUNTER
Ok.  She will ask them if it's ok for the NP to fill out.  Patient wants to know if you can't fill them out can you refer her to someone else.

## 2024-12-18 NOTE — TELEPHONE ENCOUNTER
attempted to return the call to Anaid.  Patient did not answer. Left a voicemail and sent a my chart message for patient to contact the office.

## 2024-12-18 NOTE — TELEPHONE ENCOUNTER
Patient's sister Anaid called stating that she is applying for POA for her sister. Anaid would like to know if you can fill out papers for the court for them to do this.  Anaid also asks if provider can not do this can she recommend someone that can.  Please advise.

## 2024-12-19 ENCOUNTER — TELEPHONE (OUTPATIENT)
Dept: PSYCHIATRY | Facility: CLINIC | Age: 54
End: 2024-12-19
Payer: COMMERCIAL

## 2024-12-19 DIAGNOSIS — F41.1 GENERALIZED ANXIETY DISORDER: ICD-10-CM

## 2024-12-19 DIAGNOSIS — F95.8 VOCAL TIC DISORDER: ICD-10-CM

## 2024-12-19 DIAGNOSIS — F33.1 MAJOR DEPRESSIVE DISORDER, RECURRENT, MODERATE: Primary | ICD-10-CM

## 2024-12-19 DIAGNOSIS — F70 INTELLECTUAL DEVELOPMENTAL DISORDER, MILD: ICD-10-CM

## 2024-12-19 NOTE — TELEPHONE ENCOUNTER
Please send sister the list of psychologists we have available who are in that area. Also, try [phone] to locate psychologists in the area.

## 2024-12-19 NOTE — TELEPHONE ENCOUNTER
Patient's sister asked for a referral to the [facility] office.   spoke with provider and provider placed one into the chart.

## 2024-12-19 NOTE — TELEPHONE ENCOUNTER
Patient's sister called stating that she will have to have a licensed clinical psychologist to fill out the papers for court.  Patient's sister would like to know if provider can refer patient to a licensed psychologist in the [place] area, hopefully at a [facility] location.  Please advise.

## 2025-01-02 ENCOUNTER — TELEPHONE (OUTPATIENT)
Dept: INTERNAL MEDICINE | Facility: CLINIC | Age: 55
End: 2025-01-02

## 2025-01-02 NOTE — TELEPHONE ENCOUNTER
Caller: Anaid Butt    Relationship: Emergency Contact    Best call back number: [phone]     What is the best time to reach you: ANYTIME     Who are you requesting to speak with (clinical staff, provider,  specific staff member): CLINICAL STAFF    What was the call regarding: PATIENT'S SISTER IS CALLING IN REGARDS TO THE PAPERWORK THAT WAS SENT TO THE OFFICE BY THE COURT IN ORDER TO RECEIVE GUARDIANSHIP. SHE SAYS THAT THE COURT IS SAYING THAT THEY HAVE NOT RECEIVED THIS BACK YET.     Is it okay if the provider responds through MyChart: YES

## 2025-01-02 NOTE — TELEPHONE ENCOUNTER
HUB TO RELAY:  I have not seen a packet mailed from the court system either. Called Anaid and left ms to notify her of this.

## 2025-01-08 ENCOUNTER — TELEMEDICINE (OUTPATIENT)
Dept: PSYCHIATRY | Facility: CLINIC | Age: 55
End: 2025-01-08
Payer: COMMERCIAL

## 2025-01-08 DIAGNOSIS — F33.1 MAJOR DEPRESSIVE DISORDER, RECURRENT, MODERATE: Primary | ICD-10-CM

## 2025-01-08 DIAGNOSIS — F41.1 GENERALIZED ANXIETY DISORDER: ICD-10-CM
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
## 2025-01-14 ENCOUNTER — TELEMEDICINE (OUTPATIENT)
Dept: PSYCHIATRY | Facility: CLINIC | Age: 55
End: 2025-01-14
Payer: COMMERCIAL

## 2025-01-14 DIAGNOSIS — F70 INTELLECTUAL DEVELOPMENTAL DISORDER, MILD: ICD-10-CM

## 2025-01-14 DIAGNOSIS — F33.1 MAJOR DEPRESSIVE DISORDER, RECURRENT, MODERATE: ICD-10-CM

## 2025-01-14 DIAGNOSIS — F95.8 VOCAL TIC DISORDER: ICD-10-CM

## 2025-01-14 DIAGNOSIS — F41.1 GENERALIZED ANXIETY DISORDER: Primary | ICD-10-CM

## 2025-01-14 RX ORDER — BUSPIRONE HYDROCHLORIDE 5 MG/1
5 TABLET ORAL 2 TIMES DAILY
Qty: 60 TABLET | Refills: 5 | Status: SHIPPED | OUTPATIENT
Start: 2025-01-14

## 2025-01-14 NOTE — PATIENT INSTRUCTIONS
For concerns or needing assistance call the [facility] at [phone]  START Buspar for anxiety- twice daily  CONTINUE with zoloft 50mg at bedtime

## 2025-01-14 NOTE — PROGRESS NOTES
"Mode of Visit: Video  Location of patient: -HOME-  Location of provider: +HOME+  You have chosen to receive care through a telehealth visit.  The patient has signed the video visit consent form.  The visit included audio and video interaction. No technical issues occurred during this visit.  This provider is located at [facility], [address] [place] [address], [facility], [id number] using a secure [employer] Video Visit through [employer]. Patient is being seen remotely via telehealth at their home address in [place], and stated they are in a secure environment for this session. The patient's condition being diagnosed/treated is appropriate for telemedicine. The provider identified herself as well as her credentials.   The patient, and/or patients guardian, consent to be seen remotely, and when consent is given they understand that the consent allows for patient identifiable information to be sent to a third party as needed.   They may refuse to be seen remotely at any time. The electronic data is encrypted and password protected, and the patient and/or guardian has been advised of the potential risks to privacy not withstanding such measures.   PT Identifiers used: Name and .       You have chosen to receive care through a telehealth visit.  Do you consent to use a video/audio connection for your medical care today? Yes      Subjective   Dali Batista is a 54 y.o. female who presents today for follow up  She presents with her sister, Anaid, who she lives with.  There is a signed release on file.    Chief Complaint:  \" Anxiety, depression, intellectual disability, vocal tic\"    History of Present Illness:     History of Present Illness  Patient presented to appointment a little bit early, she is currently living with her sister who is going through the proper channels to become the patient's  guardian.  Anaid reported that the patient's hearing for guardianship has been pushed back to .  They are " also working on independent housing through the [employer], application was in 2 weeks ago for that.  Also looking at going to vocational rehab at a campus near [facility].  The  has helped refer Dali to a psychologist for another psychological evaluation.  Patient continues to have a vocal tic that seems to be more exacerbated with anxiety.  Also Anaid reports that patient seems to be more anxious in the afternoons.  Decided to start low dose of BuSpar a couple times a day to see if this might help with the tic and the anxiety.  Patient has entered into psychotherapy and has another appointment coming up in a couple of weeks.  Had initial encounter on 8 January.  Previous PHQ-9 score was 0, today is scored at 4, previous LAURENT-7 score was 4 today is scored at 6.  Patient reports her appetite has been good and she has actually gained some weight.          Patient Health Questionnaire-9 (PHQ-9) (Depression Screening Tool)  Little interest or pleasure in doing things? (Patient-Rptd) Not at all   Feeling down, depressed, or hopeless? (Patient-Rptd) Not at all   PHQ-2 Total Score (Patient-Rptd) 0   Trouble falling or staying asleep, or sleeping too much? (Patient-Rptd) Not at all   Feeling tired or having little energy? (Patient-Rptd) Not at all   Poor appetite or overeating? (Patient-Rptd) Almost all   Feeling bad about yourself - or that you are a failure or have let yourself or your family down? (Patient-Rptd) Not at all   Trouble concentrating on things, such as reading the newspaper or watching television? (Patient-Rptd) Not at all   Moving or speaking so slowly that other people could have noticed? Or the opposite - being so fidgety or restless that you have been moving around a lot more than usual? (Patient-Rptd) Several days   Thoughts that you would be better off dead, or of hurting yourself in some way? (Patient-Rptd) Not at all   PHQ-9 Total Score (Patient-Rptd) 4    If you checked off any problems, how difficult have these problems made it for you to do your work, take care of things at home, or get along with other people? (Patient-Rptd) Not difficult at all         PHQ-9 Total Score: (Patient-Rptd) 4       Generalized Anxiety Disorder 7-Item (LAURENT-7) Screening Tool  Feeling nervous, anxious or on edge: (Patient-Rptd) More than half the days  Not being able to stop or control worrying: (Patient-Rptd) Several days  Worrying too much about different things: (Patient-Rptd) Several days  Trouble Relaxing: (Patient-Rptd) Not at all  Being so restless that it is hard to sit still: (Patient-Rptd) More than half the days  Feeling afraid as if something awful might happen: (Patient-Rptd) Not at all  Becoming easily annoyed or irritable: (Patient-Rptd) Not at all  LAURENT 7 Total Score: (Patient-Rptd) 6  If you checked any problems, how difficult have these problems made it for you to do your work, take care of things at home, or get along with other people: (Patient-Rptd) Not difficult at all         The following portions of the patient's history were reviewed and updated as appropriate: allergies, current medications, past family history, past medical history, past social history, past surgical history and problem list.    Past Psychiatric History:  Began Treatment: As a child patient reportedly was in special needs classes.  Diagnoses:Depression, Anxiety, and vocal tic disorder, mild intellectual disability, full scale IQ measured at 63 by psychological assessment at [facility] in [facility] in August 2024  Psychiatrist: Most recently patient was treated at [facility] in [facility].  Saw a psychiatrist there virtually for medication management.  Therapist: Most recently patient was seeing a therapist named Jaclyn at [facility] in [facility].  Admission History: Patient reported she was admitted to [facility] inpatient unit after the  death of her mother due to overwhelming grief.  Medication Trials: Remeron gave her increased appetite, clonidine (?), Zoloft at 50 mg as of initial encounter on 2024.  This medicine seems to be helping some.  Self Harm: Denies  Suicide Attempts:Denies   Psychosis, Anxiety, Depression: Denies  Patient denied any history of a head injury or seizure  Denied any history of hallucinations or psychosis    Past Medical History:  Past Medical History:   Diagnosis Date    Anxiety     Depression     Mild cognitive impairment     Vocal tic disorder        Substance Abuse History:   Types:Denies all, including illicit       Social History:  Social History     Socioeconomic History    Marital status: Single    Number of children: 1    Highest education level: High school graduate   Tobacco Use    Smoking status: Never    Smokeless tobacco: Never   Vaping Use    Vaping status: Never Used   Substance and Sexual Activity    Alcohol use: Not Currently    Drug use: Never    Sexual activity: Not Currently     Partners: Male   Current support system consists of her sister Anaid.  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  She went through [employer] in the past to find work.    Family History:  Family History   Problem Relation Age of Onset    Hypertension Mother     Hyperlipidemia Mother     Osteoporosis Mother     Stroke Mother     Diabetes type II Mother     Heart attack Father     Hypertension Father     Thyroid disease Maternal Grandmother     Dementia Maternal Grandmother     Cancer Paternal Grandmother     Dementia Paternal Grandmother     Breast cancer Neg Hx     Ovarian cancer Neg Hx        Past Surgical History:  Past Surgical History:   Procedure Laterality Date     SECTION      FRACTURE SURGERY  over 10 years    fell and broke ankle/foot       Problem List:  There is no problem list on file for this patient.      Allergy:   No Known  Allergies     Current Medications:   Current Outpatient Medications   Medication Sig Dispense Refill    famotidine (Pepcid) 20 MG tablet Take 1 tablet by mouth 2 (Two) Times a Day As Needed for Heartburn. 60 tablet 3    Loratadine (ALAVERT PO) Take 1 tablet by mouth. OTC daily      montelukast (SINGULAIR) 10 MG tablet Take 1 tablet by mouth Every Night. 30 tablet 5    sertraline (ZOLOFT) 50 MG tablet Take one oral tablet at bedtime with food 90 tablet 3    vitamin D (ERGOCALCIFEROL) 1.25 MG (18343 UT) capsule capsule Take 1 capsule by mouth 1 (One) Time Per Week. (Patient taking differently: Take 1 capsule by mouth 1 (One) Time Per Week. Pt was taking this but reported she is taking OTC Vitamin D)      busPIRone (BUSPAR) 5 MG tablet Take 1 tablet by mouth 2 (Two) Times a Day. 60 tablet 5     No current facility-administered medications for this visit.       Review of Systems:    Review of Systems   Psychiatric/Behavioral:  The patient is nervous/anxious.    All other systems reviewed and are negative.        Physical Exam:   Physical Exam  Vitals reviewed.   Constitutional:       Appearance: Normal appearance. She is well-developed and well-groomed.   HENT:      Head: Normocephalic.   Neurological:      Mental Status: She is alert.   Psychiatric:         Attention and Perception: Attention and perception normal.         Mood and Affect: Affect normal. Mood is anxious.         Speech: Speech normal.         Behavior: Behavior normal. Behavior is cooperative.         Thought Content: Thought content normal.      Comments: Noticeable vocal tic of clearing her throat         Vitals:  There were no vitals taken for this visit. There is no height or weight on file to calculate BMI.  Due to extenuating circumstances and possible current health risks associated with the patient being present in a clinical setting (with current health restrictions in place in regards to possible COVID 19 transmission/exposure), the patient  was seen remotely [facility] and telephone encounter.  Unable to obtain vital signs due to nature of remote visit.  Height stated at 62 inches.  Weight stated at 123 pounds.    Last 3 Blood Pressure Readings:  BP Readings from Last 3 Encounters:   10/10/24 126/78   09/12/24 122/68            Mental Status Exam:   Hygiene:   good  Cooperation:  Cooperative  Eye Contact:  Good  Psychomotor Behavior:  Appropriate  Affect:  Full range  Mood: anxious  Hopelessness: Denies  Speech:  Normal  Thought Process:  Goal directed and Linear  Thought Content:  Normal  Suicidal:  None  Homicidal:  None  Hallucinations:  None  Delusion:  None  Memory:  Intact  Orientation:  Person, Place, Time, and Situation  Reliability:  good  Insight:  Good  Judgement:  Good  Impulse Control:  Good  Physical/Medical Issues:  No        Lab Results:   Orders Only on 10/10/2024   Component Date Value Ref Range Status    TyrerCuzick 10/10/2024 4.6   Final    NCCN 10/10/2024 NCCN not met   Final    High Risk Cancer Risk Assessment   Office Visit on 10/10/2024   Component Date Value Ref Range Status    Glucose 10/10/2024 85  65 - 99 mg/dL Final    BUN 10/10/2024 23 (H)  6 - 20 mg/dL Final    Creatinine 10/10/2024 0.71  0.57 - 1.00 mg/dL Final    Sodium 10/10/2024 139  136 - 145 mmol/L Final    Potassium 10/10/2024 4.7  3.5 - 5.2 mmol/L Final    Chloride 10/10/2024 104  98 - 107 mmol/L Final    CO2 10/10/2024 26.0  22.0 - 29.0 mmol/L Final    Calcium 10/10/2024 9.6  8.6 - 10.5 mg/dL Final    Total Protein 10/10/2024 6.8  6.0 - 8.5 g/dL Final    Albumin 10/10/2024 4.0  3.5 - 5.2 g/dL Final    ALT (SGPT) 10/10/2024 20  1 - 33 U/L Final    AST (SGOT) 10/10/2024 23  1 - 32 U/L Final    Alkaline Phosphatase 10/10/2024 157 (H)  39 - 117 U/L Final    Total Bilirubin 10/10/2024 0.3  0.0 - 1.2 mg/dL Final    Globulin 10/10/2024 2.8  gm/dL Final    A/G Ratio 10/10/2024 1.4  g/dL Final    BUN/Creatinine Ratio 10/10/2024 32.4 (H)   7.0 - 25.0 Final    Anion Gap 10/10/2024 9.0  5.0 - 15.0 mmol/L Final    eGFR 10/10/2024 100.6  >60.0 mL/min/1.73 Final   Office Visit on 09/12/2024   Component Date Value Ref Range Status    Hemoglobin A1C 09/12/2024 4.90  4.80 - 5.60 % Final    25 Hydroxy, Vitamin D 09/12/2024 47.6  30.0 - 100.0 ng/ml Final    Vitamin B-12 09/12/2024 517  211 - 946 pg/mL Final    Total Cholesterol 09/12/2024 214 (H)  0 - 200 mg/dL Final    Triglycerides 09/12/2024 63  0 - 150 mg/dL Final    HDL Cholesterol 09/12/2024 94 (H)  40 - 60 mg/dL Final    LDL Cholesterol  09/12/2024 109 (H)  0 - 100 mg/dL Final    VLDL Cholesterol 09/12/2024 11  5 - 40 mg/dL Final    LDL/HDL Ratio 09/12/2024 1.14   Final    Glucose 09/12/2024 75  65 - 99 mg/dL Final    BUN 09/12/2024 18  6 - 20 mg/dL Final    Creatinine 09/12/2024 0.83  0.57 - 1.00 mg/dL Final    Sodium 09/12/2024 137  136 - 145 mmol/L Final    Potassium 09/12/2024 5.0  3.5 - 5.2 mmol/L Final    Chloride 09/12/2024 103  98 - 107 mmol/L Final    CO2 09/12/2024 24.5  22.0 - 29.0 mmol/L Final    Calcium 09/12/2024 9.8  8.6 - 10.5 mg/dL Final    Total Protein 09/12/2024 6.8  6.0 - 8.5 g/dL Final    Albumin 09/12/2024 4.2  3.5 - 5.2 g/dL Final    ALT (SGPT) 09/12/2024 48 (H)  1 - 33 U/L Final    AST (SGOT) 09/12/2024 31  1 - 32 U/L Final    Alkaline Phosphatase 09/12/2024 162 (H)  39 - 117 U/L Final    Total Bilirubin 09/12/2024 0.2  0.0 - 1.2 mg/dL Final    Globulin 09/12/2024 2.6  gm/dL Final    A/G Ratio 09/12/2024 1.6  g/dL Final    BUN/Creatinine Ratio 09/12/2024 21.7  7.0 - 25.0 Final    Anion Gap 09/12/2024 9.5  5.0 - 15.0 mmol/L Final    eGFR 09/12/2024 83.4  >60.0 mL/min/1.73 Final    Hepatitis C Ab 09/12/2024 Non-Reactive  Non-Reactive Final    TSH 09/12/2024 2.130  0.270 - 4.200 uIU/mL Final    HIV DUO 09/12/2024 Non-Reactive  Non-Reactive Final    WBC 09/12/2024 5.74  3.40 - 10.80 10*3/mm3 Final    RBC 09/12/2024 4.54  3.77 - 5.28 10*6/mm3 Final    Hemoglobin 09/12/2024 13.1  12.0  - 15.9 g/dL Final    Hematocrit 09/12/2024 42.3  34.0 - 46.6 % Final    MCV 09/12/2024 93.2  79.0 - 97.0 fL Final    MCH 09/12/2024 28.9  26.6 - 33.0 pg Final    MCHC 09/12/2024 31.0 (L)  31.5 - 35.7 g/dL Final    RDW 09/12/2024 13.1  12.3 - 15.4 % Final    RDW-SD 09/12/2024 44.4  37.0 - 54.0 fl Final    MPV 09/12/2024 10.9  6.0 - 12.0 fL Final    Platelets 09/12/2024 278  140 - 450 10*3/mm3 Final    Neutrophil % 09/12/2024 65.7  42.7 - 76.0 % Final    Lymphocyte % 09/12/2024 21.4  19.6 - 45.3 % Final    Monocyte % 09/12/2024 8.2  5.0 - 12.0 % Final    Eosinophil % 09/12/2024 3.3  0.3 - 6.2 % Final    Basophil % 09/12/2024 1.2  0.0 - 1.5 % Final    Immature Grans % 09/12/2024 0.2  0.0 - 0.5 % Final    Neutrophils, Absolute 09/12/2024 3.77  1.70 - 7.00 10*3/mm3 Final    Lymphocytes, Absolute 09/12/2024 1.23  0.70 - 3.10 10*3/mm3 Final    Monocytes, Absolute 09/12/2024 0.47  0.10 - 0.90 10*3/mm3 Final    Eosinophils, Absolute 09/12/2024 0.19  0.00 - 0.40 10*3/mm3 Final    Basophils, Absolute 09/12/2024 0.07  0.00 - 0.20 10*3/mm3 Final    Immature Grans, Absolute 09/12/2024 0.01  0.00 - 0.05 10*3/mm3 Final    nRBC 09/12/2024 0.0  0.0 - 0.2 /100 WBC Final    Color, UA 09/12/2024 Yellow  Yellow, Straw Final    Appearance, UA 09/12/2024 Clear  Clear Final    pH, UA 09/12/2024 6.5  5.0 - 8.0 Final    Specific Gravity, UA 09/12/2024 1.022  1.005 - 1.030 Final    Glucose, UA 09/12/2024 Negative  Negative Final    Ketones, UA 09/12/2024 Negative  Negative Final    Bilirubin, UA 09/12/2024 Negative  Negative Final    Blood, UA 09/12/2024 Negative  Negative Final    Protein, UA 09/12/2024 Negative  Negative Final    Leuk Esterase, UA 09/12/2024 Negative  Negative Final    Nitrite, UA 09/12/2024 Negative  Negative Final    Urobilinogen, UA 09/12/2024 0.2 E.U./dL  0.2 - 1.0 E.U./dL Final    RBC, UA 09/12/2024 0-2  None Seen, 0-2 /HPF Final    WBC, UA 09/12/2024 0-2  None Seen, 0-2 /HPF Final    Bacteria, UA 09/12/2024 None Seen   "None Seen /HPF Final    Squamous Epithelial Cells, UA 09/12/2024 0-2  None Seen, 0-2 /HPF Final    Hyaline Casts, UA 09/12/2024 None Seen  None Seen /LPF Final    Methodology 09/12/2024 Automated Microscopy   Final              Assessment & Plan   Diagnoses and all orders for this visit:    1. Generalized anxiety disorder (Primary)  -     busPIRone (BUSPAR) 5 MG tablet; Take 1 tablet by mouth 2 (Two) Times a Day.  Dispense: 60 tablet; Refill: 5    2. Major depressive disorder, recurrent, moderate    3. Intellectual developmental disorder, mild    4. Vocal tic disorder          Visit Diagnoses:    ICD-10-CM ICD-9-CM   1. Generalized anxiety disorder  F41.1 300.02   2. Major depressive disorder, recurrent, moderate  F33.1 296.32   3. Intellectual developmental disorder, mild  F70 317   4. Vocal tic disorder  F95.8 307.20           GOALS:  Short Term Goals: Patient will be compliant with medication, and patient will have no significant medication related side effects.  Patient will be engaged in psychotherapy as indicated.  Patient will report subjective improvement of symptoms.  Long term goals: To stabilize mood and treat/improve subjective symptoms, the patient will stay out of the hospital, the patient will be at an optimal level of functioning, and the patient will take all medications as prescribed.  The patient/guardian verbalized understanding and agreement with goals that were mutually set.      RISK ASSESSMENT  Current harm-to-self risk is reported by pt as \"none.\"  Current balx-nw-hzybmq risk is reported by pt as \"none.\"   No suicidal thoughts, intent, plan is appreciated by this provider on this date of exam.   No homicidal thoughts, intent, plan is appreciated by this provider on this date.      TREATMENT PLAN: Continue supportive psychotherapy efforts and medications as indicated.   Pharmacological and Non-Pharmacological treatment options discussed during today's visit. Patient/Guardian acknowledged and " verbally consented with current treatment plan and was educated on the importance of compliance with treatment and follow-up appointments.      MEDICATION ISSUES:  Discussed medication options and treatment plan of prescribed medication as well as the risks, benefits, any black box warnings, and side effects including potential falls, possible impaired driving, and metabolic adversities among others. Patient is agreeable to call the office with any worsening of symptoms or onset of side effects, or if any concerns or questions arise.  The contact information for the office is made available to the patient. Patient is agreeable to call 911 or go to the nearest ER should they begin having any SI/HI, or if any urgent concerns arise. No medication side effects or related complaints today.     Continue with Zoloft 50 mg tablet take 1 oral tablet at bedtime with food  Start buspirone 5 mg tablet take 1 tablet twice daily for anxiety/vocal tic      MEDS ORDERED DURING VISIT:  New Medications Ordered This Visit   Medications    busPIRone (BUSPAR) 5 MG tablet     Sig: Take 1 tablet by mouth 2 (Two) Times a Day.     Dispense:  60 tablet     Refill:  5       Follow Up Appointment:   Return in about 6 weeks (around 2/25/2025) for Recheck, Video visit.               This document has been electronically signed by ARTURO Valdes  January 14, 2025 09:21 EST    Dictated Utilizing Dragon Dictation: Part of this note may be an electronic transcription/translation of spoken language to printed text using the Dragon Dictation System.

## 2025-01-14 NOTE — TELEPHONE ENCOUNTER
CALLED TO VERIFY IF WE HAVE RECEIVED THE PACKET FROM COURT.    PLEASE ADVISE.  CALL BACK:[phone]

## 2025-01-15 NOTE — TELEPHONE ENCOUNTER
Spoke with Anaid (sister) she will call the courts and see if it was even mailed. She is going to check and see if this is something she can  and bring to our office.

## 2025-01-16 ENCOUNTER — TELEPHONE (OUTPATIENT)
Dept: INTERNAL MEDICINE | Facility: CLINIC | Age: 55
End: 2025-01-16
Payer: COMMERCIAL

## 2025-01-16 DIAGNOSIS — J30.2 SEASONAL ALLERGIES: Primary | ICD-10-CM

## 2025-01-16 NOTE — TELEPHONE ENCOUNTER
Sister (Anaid) is requesting pt be referred to an allergist for ongoing seasonal allergies, has no preference just someone that will taker her insurance

## 2025-01-21 ENCOUNTER — TELEPHONE (OUTPATIENT)
Dept: INTERNAL MEDICINE | Facility: CLINIC | Age: 55
End: 2025-01-21

## 2025-01-21 NOTE — TELEPHONE ENCOUNTER
Caller: Anaid Butt    Relationship: Emergency Contact    Best call back number: [phone]    What was the call regarding: PTS SISTER ROHITH WANTS TO KNOW IF THE [employer] PAPERS HAVE BEEN FILLED OUT AND MAILED. PLEASE CALL HER ASAP TO LET HER KNOW      Is it okay if the provider responds through MyChart: NO

## 2025-01-28 ENCOUNTER — LAB (OUTPATIENT)
Dept: LAB | Facility: HOSPITAL | Age: 55
End: 2025-01-28
Payer: COMMERCIAL

## 2025-01-28 ENCOUNTER — TRANSCRIBE ORDERS (OUTPATIENT)
Dept: LAB | Facility: HOSPITAL | Age: 55
End: 2025-01-28
Payer: COMMERCIAL

## 2025-01-28 DIAGNOSIS — Z01.812 PRE-OPERATIVE LABORATORY EXAMINATION: ICD-10-CM

## 2025-01-28 DIAGNOSIS — Z01.812 PRE-OPERATIVE LABORATORY EXAMINATION: Primary | ICD-10-CM

## 2025-01-28 LAB
ANION GAP SERPL CALCULATED.3IONS-SCNC: 10 MMOL/L (ref 5–15)
BUN SERPL-MCNC: 16 MG/DL (ref 6–20)
BUN/CREAT SERPL: 18.6 (ref 7–25)
CALCIUM SPEC-SCNC: 9.9 MG/DL (ref 8.6–10.5)
CHLORIDE SERPL-SCNC: 104 MMOL/L (ref 98–107)
CO2 SERPL-SCNC: 24 MMOL/L (ref 22–29)
CREAT SERPL-MCNC: 0.86 MG/DL (ref 0.57–1)
DEPRECATED RDW RBC AUTO: 43.5 FL (ref 37–54)
EGFRCR SERPLBLD CKD-EPI 2021: 79.9 ML/MIN/1.73
ERYTHROCYTE [DISTWIDTH] IN BLOOD BY AUTOMATED COUNT: 13.7 % (ref 12.3–15.4)
GLUCOSE SERPL-MCNC: 89 MG/DL (ref 65–99)
HCT VFR BLD AUTO: 43.6 % (ref 34–46.6)
HGB BLD-MCNC: 14 G/DL (ref 12–15.9)
MCH RBC QN AUTO: 27.7 PG (ref 26.6–33)
MCHC RBC AUTO-ENTMCNC: 32.1 G/DL (ref 31.5–35.7)
MCV RBC AUTO: 86.2 FL (ref 79–97)
PLATELET # BLD AUTO: 275 10*3/MM3 (ref 140–450)
PMV BLD AUTO: 11.5 FL (ref 6–12)
POTASSIUM SERPL-SCNC: 4.2 MMOL/L (ref 3.5–5.2)
RBC # BLD AUTO: 5.06 10*6/MM3 (ref 3.77–5.28)
SODIUM SERPL-SCNC: 138 MMOL/L (ref 136–145)
WBC NRBC COR # BLD AUTO: 6.86 10*3/MM3 (ref 3.4–10.8)

## 2025-01-28 PROCEDURE — 85027 COMPLETE CBC AUTOMATED: CPT

## 2025-01-28 PROCEDURE — 36415 COLL VENOUS BLD VENIPUNCTURE: CPT

## 2025-01-28 PROCEDURE — 80048 BASIC METABOLIC PNL TOTAL CA: CPT

## 2025-01-29 ENCOUNTER — TELEPHONE (OUTPATIENT)
Dept: PSYCHIATRY | Facility: CLINIC | Age: 55
End: 2025-01-29
Payer: COMMERCIAL

## 2025-01-29 NOTE — TELEPHONE ENCOUNTER
I cannot fill these out. A significant portion of this is to determine level of impairment in thought processes and the ability to make decisions. This would require a psychological evaluation which the pt has had done already.

## 2025-01-29 NOTE — TELEPHONE ENCOUNTER
Patient is trying to sign up for disability and has a form that is needed to be completed. Sister is asking if that would be something you are comfortable filling out.     Sister is faxing the form to me once I have that I will forward to yout email.  Please advise

## 2025-01-29 NOTE — TELEPHONE ENCOUNTER
I have forward the forms to both of patients providers Wilda Matamoros and Radha Figueroa. Both providers has stated they are unable to complete the forms. I did reach out to patients sister Anaid, (there is a MARIPOSA on file) I have made her aware that providers are unable to complete. Anaid stated she will send forms to patients psychologist.

## 2025-01-30 ENCOUNTER — TELEMEDICINE (OUTPATIENT)
Dept: PSYCHIATRY | Facility: CLINIC | Age: 55
End: 2025-01-30
Payer: COMMERCIAL

## 2025-01-30 DIAGNOSIS — F41.1 GENERALIZED ANXIETY DISORDER: Primary | ICD-10-CM

## 2025-01-30 DIAGNOSIS — F43.29 GRIEF REACTION WITH PROLONGED BEREAVEMENT: ICD-10-CM

## 2025-01-30 DIAGNOSIS — F33.1 MAJOR DEPRESSIVE DISORDER, RECURRENT, MODERATE: ICD-10-CM

## 2025-01-30 NOTE — PROGRESS NOTES
Dali Batista is a 54 y.o. female presenting to [facility] (through [facility]), [address], [address] using a secure [employer] Video Visit through [employer] for assessment with Radha Figueroa LCSW. The patient is seen remotely in their home using [facility] My Chart. Patient is being seen via telehealth and stated they are in a secure environment for this session. The patient's condition being diagnosed/treated is appropriate for telemedicine. The provider identified herself as well as her credentials. The patient and/or patients guardian consent to be seen remotely, and when consent is given they understand that the consent allows for patient identifiable information to be sent to a third party as needed. They may refuse to be seen remotely at any time. The electronic data is encrypted and password protected, and the patient has been advised of the potential risks to privacy not withstanding such measures.    You have chosen to receive care through a telehealth visit. Do you consent to use a video/audio connection for your medical care today?   Yes    Time In: 1:23  Time Out: 1:44    Chief Complaint:  Anxiety / Depression / Grief      Progress Note / Session Description: Patient reports some anxiety related to upcoming medical procedure which will be completed in 1 week.  Therapist supported patient in verbalizing and processing thoughts and feelings related to this experience, normalized and validated patient feelings.  Therapist also supported patient in continued exploration of thoughts and feelings related to grief due to loss of mom, dad, and aunt.  Therapist provided psychoeducation and supported patient identifying methods for engaging consistently and healthfully with these feelings.  Patient was receptive and engaged.  Discussion about ongoing treatment plan and upcoming session.  Return in 3 weeks.    Clinical Intervention /  Modalities Utilized: Emotional processing, rapport building, psychoeducation    Assessment:  Patient appears to be maintaining stability and continues to be engaged in treatment. Patient is making reasonable progress towards goals but continues to struggle with anxiety, depression, and grief which impacts functioning across life domains. As a result, patient can be expected to continue benefiting from treatment and would likely be at an increased risk of decompensation otherwise.     Diagnosis:     ICD-10-CM ICD-9-CM   1. Generalized anxiety disorder  F41.1 300.02   2. Major depressive disorder, recurrent, moderate  F33.1 296.32   3. 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        Mental Status Exam:   MENTAL STATUS EXAM   General Appearance:  Cleanly groomed and dressed  Eye Contact:  Good eye contact  Attitude:  Cooperative  Speech:  Normal rate, tone, volume  Mood and affect:  Normal, pleasant  Hopelessness:  Denies  Loneliness: Denies  Thought Process:  Logical  Associations/ Thought Content:  No delusions  Hallucinations:  None  Suicidal Ideations:  Not present  Homicidal Ideation:  Not present  Sensorium:  Alert and clear  Orientation:  Person, place, time and situation  Immediate Recall, Recent, and Remote Memory:  Intact  Attention Span/ Concentration:  Good and easily distracted  Fund of Knowledge:  Limited  Intellectual Functioning:  Previous IDD dx  Insight:  Limited  Judgement:  Limited  Reliability:  Good  Impulse Control:  Good       Functional Status: Mild/Moderate impairment     Prognosis: Good with Ongoing Treatment     Plan:   Patient will continue in individual outpatient therapy with focus on improved functioning and coping skills, maintaining stability, and avoiding decompensation and the need for higher level of care.     Patient will contact this office ([facility] at [phone]), call 911 or present to the nearest emergency room should suicidal or  homicidal ideations occur. Provide Cognitive Behavioral Therapy and Solution Focused Therapy to improve functioning, maintain stability, and avoid decompensation and the need for higher level of care.      Return onFOLLOWUP@ , or earlier if symptoms worsen or fail to improve.        This document has been electronically signed by Radha Figueroa LCSW  January 30, 2025 13:26 EST    Dictated Utilizing Dragon Dictation: Part of this note may be an electronic transcription/translation of spoken language to printed text using the Dragon Dictation System.

## 2025-02-03 ENCOUNTER — LAB REQUISITION (OUTPATIENT)
Dept: LAB | Facility: HOSPITAL | Age: 55
End: 2025-02-03
Payer: COMMERCIAL

## 2025-02-03 DIAGNOSIS — R22.0 LOCALIZED SWELLING, MASS AND LUMP, HEAD: ICD-10-CM

## 2025-02-03 PROCEDURE — 88304 TISSUE EXAM BY PATHOLOGIST: CPT | Performed by: SURGERY

## 2025-02-04 LAB
CYTO UR: NORMAL
LAB AP CASE REPORT: NORMAL
LAB AP CLINICAL INFORMATION: NORMAL
PATH REPORT.FINAL DX SPEC: NORMAL
PATH REPORT.GROSS SPEC: NORMAL

## 2025-02-17 ENCOUNTER — TELEPHONE (OUTPATIENT)
Dept: INTERNAL MEDICINE | Facility: CLINIC | Age: 55
End: 2025-02-17

## 2025-02-17 DIAGNOSIS — Z11.1 TUBERCULOSIS SCREENING: Primary | ICD-10-CM

## 2025-02-17 NOTE — TELEPHONE ENCOUNTER
Caller: Anaid Butt    Relationship: Emergency Contact    Best call back number: [phone]     What orders are you requesting (i.e. lab or imaging): TB SKIN TEST    In what timeframe would the patient need to come in: ASAP, PRIOR TO 2.21.25    Where will you receive your lab/imaging services: IN PCP OFFICE IF POSSIBLE    Additional notes: IF PATIENT IS ABLE TO GET THIS DONE ELSEWHERE, PLEASE INFORM, PLEASE CONTACT WHEN TEST IS ABLE TO BE COMPLETED

## 2025-02-20 ENCOUNTER — TELEMEDICINE (OUTPATIENT)
Dept: PSYCHIATRY | Facility: CLINIC | Age: 55
End: 2025-02-20
Payer: COMMERCIAL

## 2025-02-20 DIAGNOSIS — F41.1 GENERALIZED ANXIETY DISORDER: Primary | ICD-10-CM

## 2025-02-20 DIAGNOSIS — F33.1 MAJOR DEPRESSIVE DISORDER, RECURRENT, MODERATE: ICD-10-CM

## 2025-02-20 NOTE — PROGRESS NOTES
Dali Batista is a 55 y.o. female presenting to [facility] (through [facility]), [address], [address] using a secure [employer] Video Visit through [employer] for assessment with Radha Figueroa LCSW. Therapist is located in a secure location within their home in [place].The patient is seen remotely in their home using [facility] My Chart. Patient is being seen via telehealth and stated they are in a secure environment for this session. The patient's condition being diagnosed/treated is appropriate for telemedicine. The provider identified herself as well as her credentials. The patient and/or patients guardian consent to be seen remotely, and when consent is given they understand that the consent allows for patient identifiable information to be sent to a third party as needed. They may refuse to be seen remotely at any time. The electronic data is encrypted and password protected, and the patient has been advised of the potential risks to privacy not withstanding such measures.    You have chosen to receive care through a telehealth visit. Do you consent to use a video/audio connection for your medical care today?   Yes    Time In: 3:31  Time Out: 3:57     Chief Complaint: Anxiety/Depression      Progress Note / Session Description: Patient reports they have been managing well in recent weeks, reports feelings of excitement and some mild anxiety related to beginning of work training program.  Patient states they will be living away from family for the first time and expresses some feelings of apprehension but overall excitement.  Therapist normalized and validated patient feelings and supported patient in verbalizing and processing recent stressors.  Therapist asked questions about patient's history and supported patient in identifying topics for discussion in future sessions.  Patient reports benefit to engaging in therapeutic care on a  consistent basis. Therapist and patient discussed goals for treatment, particularly focused on managing anxiety during upcoming life changes.  Patient was receptive and engaged, return in 3 weeks.    Clinical Intervention / Modalities Utilized: Emotional processing, goals discussion, solution focused therapy    Assessment:  Patient appears to be maintaining stability and continues to be engaged in treatment. Patient is making reasonable progress towards goals but continues to struggle with anxiety and depression which impact functioning across life domains. As a result, patient can be expected to continue benefiting from treatment and would likely be at an increased risk of decompensation otherwise.     Diagnosis:     ICD-10-CM ICD-9-CM   1. Generalized anxiety disorder  F41.1 300.02   2. Major depressive disorder, recurrent, moderate  F33.1 296.32        Mental Status Exam:   MENTAL STATUS EXAM   General Appearance:  Cleanly groomed and dressed  Eye Contact:  Good eye contact  Attitude:  Cooperative  Speech:  Normal rate, tone, volume  Mood and affect:  Normal, pleasant  Hopelessness:  Denies  Loneliness: Denies  Thought Process:  Logical  Associations/ Thought Content:  No delusions  Hallucinations:  None  Suicidal Ideations:  Not present  Homicidal Ideation:  Not present  Sensorium:  Alert and clear  Orientation:  Person, place, time and situation  Immediate Recall, Recent, and Remote Memory:  Intact  Attention Span/ Concentration:  Good and easily distracted  Fund of Knowledge:  Limited  Intellectual Functioning:  Previous IDD dx  Insight:  Limited  Judgement:  Limited  Reliability:  Good  Impulse Control:  Good       Functional Status: Mild impairment /Moderate Impairment     Prognosis: Good with Ongoing Treatment     Plan:   Patient will continue in individual outpatient therapy with focus on improved functioning and coping skills, maintaining stability, and avoiding decompensation and the need for higher  level of care.     Patient will contact this office ([facility] at [phone]), call 911 or present to the nearest emergency room should suicidal or homicidal ideations occur. Provide Cognitive Behavioral Therapy and Solution Focused Therapy to improve functioning, maintain stability, and avoid decompensation and the need for higher level of care.      Return onFOLLOWUP@ , or earlier if symptoms worsen or fail to improve.        This document has been electronically signed by Radha Figueroa LCSW  February 20, 2025 16:00 EST    Dictated Utilizing Dragon Dictation: Part of this note may be an electronic transcription/translation of spoken language to printed text using the Dragon Dictation System.

## 2025-03-10 ENCOUNTER — TELEMEDICINE (OUTPATIENT)
Dept: PSYCHIATRY | Facility: CLINIC | Age: 55
End: 2025-03-10
Payer: COMMERCIAL

## 2025-03-10 DIAGNOSIS — J30.2 SEASONAL ALLERGIES: ICD-10-CM

## 2025-03-10 DIAGNOSIS — F33.1 MAJOR DEPRESSIVE DISORDER, RECURRENT, MODERATE: ICD-10-CM

## 2025-03-10 DIAGNOSIS — F41.1 GENERALIZED ANXIETY DISORDER: Primary | ICD-10-CM

## 2025-03-10 DIAGNOSIS — F70 INTELLECTUAL DEVELOPMENTAL DISORDER, MILD: ICD-10-CM

## 2025-03-10 DIAGNOSIS — F95.8 VOCAL TIC DISORDER: ICD-10-CM

## 2025-03-10 RX ORDER — BUSPIRONE HYDROCHLORIDE 5 MG/1
TABLET ORAL
Qty: 60 TABLET | Refills: 5 | Status: SHIPPED | OUTPATIENT
Start: 2025-03-10

## 2025-03-10 RX ORDER — MONTELUKAST SODIUM 10 MG/1
10 TABLET ORAL NIGHTLY
Qty: 30 TABLET | Refills: 5 | Status: SHIPPED | OUTPATIENT
Start: 2025-03-10 | End: 2025-03-14 | Stop reason: SDUPTHER

## 2025-03-10 NOTE — TELEPHONE ENCOUNTER
DELETE AFTER REVIEWING: Send this encounter to the appropriate pool. See your Call Action Grid or Workflows for direction.    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     How long have you been experiencing symptoms:     Have you had these symptoms before:    [] Yes  [x] No    Have you been treated for these symptoms before:   [] Yes  [x] No    If a prescription is needed, what is your preferred pharmacy and phone number: [id number] - [place], [place] - [address] - [phone] ph - [phone] FX     Additional notes: IS A NASAL SPRAY SAFE WITH ALL THE MEDICATION SHE IS ON?

## 2025-03-10 NOTE — PATIENT INSTRUCTIONS
For concerns or needing assistance call the [facility] at [phone]  Continue with Zoloft 50 mg tablet take 1 oral tablet at bedtime with food  Continue buspirone 5 mg tablet take 1 tablet in the morning and 1 in the evening for anxiety/vocal tic

## 2025-03-10 NOTE — PROGRESS NOTES
"Mode of Visit: Video  Location of patient: -HOME-  Location of provider: +HOME+  You have chosen to receive care through a telehealth visit.  The patient has signed the video visit consent form.  The visit included audio and video interaction. No technical issues occurred during this visit.     PT Identifiers used: Name and .  Patient verbally confirmed consent for today's encounter  03/10/2025       You have chosen to receive care through a telehealth visit.  Do you consent to use a video/audio connection for your medical care today? Yes      Subjective   Dali Batista is a 55 y.o. female who presents today for follow up  She presents with her sister, Anaid, who she lives with.  There is a signed release on file.    Chief Complaint:  \" Anxiety, depression, intellectual disability, vocal tic\"    History of Present Illness:     History of Present Illness  This is a 3-month follow-up for above chief complaint.  The previously scheduled hearing for  to determine guardianship has been postponed till April 10.   patient has her disability hearing.  She is looking forward to going next week to the [employer] [facility].  She reports when she goes there to start her training she will live on campus, she will be learning life skills and job training, and hopefully learning how to be a .  There is a coffee shop near where her sister works in [place] that hires people with intellectual disabilities.  She remains on the waiting list for the independent living center however further investigation revealed that some of the living quarters had shared bathrooms and they are not comfortable with this.  There are some newer apartments that have private bathrooms.  Patient also reports her daughter sent her word that \"she did not want anything to do with me but she would come and see me if I was in the hospital.\"  This was hurtful for the " patient.  Previous PHQ-9 was scored at 4, today is scored at 0.  Previous LAURENT-7 score was 6, today is scored at 4.  Patient reported sleep and appetite have been good.  Patient reported using medications as ordered, she was taking the BuSpar in the morning and then again around noon but felt like it was given her some nervousness so has started taking the medicine further apart which is advisable.  I will rewrite the order to reflect how to properly take the medication twice a day.  States she feels like the medication does help with anxiety and vocal tic exacerbations.             Patient Health Questionnaire-9 (PHQ-9) (Depression Screening Tool)  Little interest or pleasure in doing things? (Patient-Rptd) Not at all   Feeling down, depressed, or hopeless? (Patient-Rptd) Not at all   PHQ-2 Total Score (Patient-Rptd) 0   Trouble falling or staying asleep, or sleeping too much? (Patient-Rptd) Not at all   Feeling tired or having little energy? (Patient-Rptd) Not at all   Poor appetite or overeating? (Patient-Rptd) Not at all   Feeling bad about yourself - or that you are a failure or have let yourself or your family down? (Patient-Rptd) Not at all   Trouble concentrating on things, such as reading the newspaper or watching television? (Patient-Rptd) Not at all   Moving or speaking so slowly that other people could have noticed? Or the opposite - being so fidgety or restless that you have been moving around a lot more than usual? (Patient-Rptd) Not at all   Thoughts that you would be better off dead, or of hurting yourself in some way? (Patient-Rptd) Not at all   PHQ-9 Total Score (Patient-Rptd) 0   If you checked off any problems, how difficult have these problems made it for you to do your work, take care of things at home, or get along with other people? (Patient-Rptd) Not difficult at all         PHQ-9 Total Score: (Patient-Rptd) 0       Generalized Anxiety Disorder 7-Item (LAURENT-7) Screening Tool  Feeling nervous,  anxious or on edge: (Patient-Rptd) Several days  Not being able to stop or control worrying: (Patient-Rptd) Several days  Worrying too much about different things: (Patient-Rptd) Several days  Trouble Relaxing: (Patient-Rptd) Several days  Being so restless that it is hard to sit still: (Patient-Rptd) Not at all  Feeling afraid as if something awful might happen: (Patient-Rptd) Not at all  Becoming easily annoyed or irritable: (Patient-Rptd) Not at all  LAURENT 7 Total Score: (Patient-Rptd) 4  If you checked any problems, how difficult have these problems made it for you to do your work, take care of things at home, or get along with other people: (Patient-Rptd) Not difficult at all         The following portions of the patient's history were reviewed and updated as appropriate: allergies, current medications, past family history, past medical history, past social history, past surgical history and problem list.    Past Psychiatric History:  Began Treatment: As a child patient reportedly was in special needs classes.  Diagnoses:Depression, Anxiety, and vocal tic disorder, mild intellectual disability, full scale IQ measured at 63 by psychological assessment at [facility] in [facility] in 2024  Psychiatrist: Most recently patient was treated at [facility] in [facility].  Saw a psychiatrist there virtually for medication management.  Therapist: Most recently patient was seeing a therapist named Jaclyn at [facility] in [facility].  Admission History: Patient reported she was admitted to [facility] inpatient unit after the death of her mother due to overwhelming grief.  Medication Trials: Remeron gave her increased appetite, clonidine (?), Zoloft at 50 mg as of initial encounter on 2024.  This medicine seems to be helping some.  Self Harm: Denies  Suicide Attempts:Denies   Psychosis, Anxiety, Depression: Denies  Patient denied any history of a head  injury or seizure  Denied any history of hallucinations or psychosis    Past Medical History:  Past Medical History:   Diagnosis Date    Anxiety     Depression     Mild cognitive impairment     Vocal tic disorder        Substance Abuse History:   Types:Denies all, including illicit       Social History:  Social History     Socioeconomic History    Marital status: Single    Number of children: 1    Highest education level: High school graduate   Tobacco Use    Smoking status: Never    Smokeless tobacco: Never   Vaping Use    Vaping status: Never Used   Substance and Sexual Activity    Alcohol use: Not Currently    Drug use: Never    Sexual activity: Not Currently     Partners: Male   Current support system consists of her sister Anaid.  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  She went through [employer] in the past to find work.    Family History:  Family History   Problem Relation Age of Onset    Hypertension Mother     Hyperlipidemia Mother     Osteoporosis Mother     Stroke Mother     Diabetes type II Mother     Heart attack Father     Hypertension Father     Thyroid disease Maternal Grandmother     Dementia Maternal Grandmother     Cancer Paternal Grandmother     Dementia Paternal Grandmother     Breast cancer Neg Hx     Ovarian cancer Neg Hx        Past Surgical History:  Past Surgical History:   Procedure Laterality Date     SECTION      COLONOSCOPY      FRACTURE SURGERY  over 10 years    fell and broke ankle/foot       Problem List:  There is no problem list on file for this patient.      Allergy:   No Known Allergies     Current Medications:   Current Outpatient Medications   Medication Sig Dispense Refill    busPIRone (BUSPAR) 5 MG tablet Take one oral tablet in the morning and in the evening. 60 tablet 5    famotidine (Pepcid) 20 MG tablet Take 1 tablet by mouth 2 (Two) Times a Day As Needed for Heartburn. 60 tablet 3    Loratadine  (ALAVERT PO) Take 1 tablet by mouth. OTC daily      montelukast (SINGULAIR) 10 MG tablet Take 1 tablet by mouth Every Night. 30 tablet 5    sertraline (ZOLOFT) 50 MG tablet Take one oral tablet at bedtime with food 90 tablet 3    vitamin D (ERGOCALCIFEROL) 1.25 MG (92540 UT) capsule capsule Take 1 capsule by mouth 1 (One) Time Per Week. (Patient taking differently: Take 1 capsule by mouth 1 (One) Time Per Week. Pt was taking this but reported she is taking OTC Vitamin D)       No current facility-administered medications for this visit.       Review of Systems:    Review of Systems   All other systems reviewed and are negative.        Physical Exam:   Physical Exam  Constitutional:       Appearance: Normal appearance. She is well-developed and well-groomed.   HENT:      Head: Normocephalic.   Neurological:      Mental Status: She is alert.   Psychiatric:         Attention and Perception: Attention and perception normal.         Mood and Affect: Mood and affect normal.         Speech: Speech normal.         Behavior: Behavior normal. Behavior is cooperative.         Thought Content: Thought content normal.      Comments: Vocal tic noted, but is improved in frequency         Vitals:  There were no vitals taken for this visit. There is no height or weight on file to calculate BMI.  Due to extenuating circumstances and possible current health risks associated with the patient being present in a clinical setting (with current health restrictions in place in regards to possible COVID 19 transmission/exposure), the patient was seen remotely today via a MyChart Video Visit through [facility] and telephone encounter.  Unable to obtain vital signs due to nature of remote visit.  Height stated at 62 inches.  Weight stated at 123 pounds.    Last 3 Blood Pressure Readings:  BP Readings from Last 3 Encounters:   10/10/24 126/78   09/12/24 122/68            Mental Status Exam:   Hygiene:   good  Cooperation:  Cooperative  Eye Contact:   "Good  Psychomotor Behavior:  Appropriate  Affect:  Full range  Mood: euthymic  Hopelessness: Denies  Speech:  Normal  Thought Process:  Goal directed and Linear  Thought Content:  Normal  Suicidal:  None  Homicidal:  None  Hallucinations:  None  Delusion:  None  Memory:  Intact  Orientation:  Person, Place, Time, and Situation  Reliability:  good  Insight:  Good  Judgement:  Good  Impulse Control:  Good  Physical/Medical Issues:  No        Lab Results:   No visits with results within 1 Month(s) from this visit.   Latest known visit with results is:   Lab Requisition on 02/03/2025   Component Date Value Ref Range Status    Case Report 02/03/2025    Final                    Value:Surgical Pathology Report                         Case: [id number]                                  Authorizing Provider:  Medhat Mendez MD      Collected:           02/03/2025 10:06 AM          Ordering Location:     [facility]   Received:            02/03/2025 11:20 AM                                 LABORATORY                                                                   Pathologist:           Alvarado Leyva MD                                                        Specimen:    Scalp                                                                                      Clinical Information 02/03/2025    Final                    Value:Localized swelling, mass and lump, head      Final Diagnosis 02/03/2025    Final                    Value:SCALP MASS POSTERIOR:  Squamous lined cyst consistent with trichilemmal cyst      Gross Description 02/03/2025    Final                    Value:1. Scalp.  Received in formalin labeled \"scalp mass posterior\" is a 3.5 x 2.7 x 1.7 cm unoriented portion of soft tissue which is partially surfaced by a 3.5 x 1.7 cm ellipse of tan, hairbearing skin.  No gross skin lesions are identified.  Sectioning reveals a unilocular cyst entirely filled with soft, tan material.  A representative " "section is submitted in a single cassette.  [facility]        Microscopic Description 02/03/2025    Final                    Value:The slides are reviewed and demonstrate histopathologic features supporting the above rendered diagnosis.                  Assessment & Plan   Diagnoses and all orders for this visit:    1. Generalized anxiety disorder (Primary)  -     busPIRone (BUSPAR) 5 MG tablet; Take one oral tablet in the morning and in the evening.  Dispense: 60 tablet; Refill: 5    2. Major depressive disorder, recurrent, moderate    3. Vocal tic disorder    4. Intellectual developmental disorder, mild            Visit Diagnoses:    ICD-10-CM ICD-9-CM   1. Generalized anxiety disorder  F41.1 300.02   2. Major depressive disorder, recurrent, moderate  F33.1 296.32   3. Vocal tic disorder  F95.8 307.20   4. Intellectual developmental disorder, mild  F70 317             GOALS:  Short Term Goals: Patient will be compliant with medication, and patient will have no significant medication related side effects.  Patient will be engaged in psychotherapy as indicated.  Patient will report subjective improvement of symptoms.  Long term goals: To stabilize mood and treat/improve subjective symptoms, the patient will stay out of the hospital, the patient will be at an optimal level of functioning, and the patient will take all medications as prescribed.  The patient/guardian verbalized understanding and agreement with goals that were mutually set.      RISK ASSESSMENT  Current harm-to-self risk is reported by pt as \"none.\"  Current [id number] risk is reported by pt as \"none.\"   No suicidal thoughts, intent, plan is appreciated by this provider on this date of exam.   No homicidal thoughts, intent, plan is appreciated by this provider on this date.      TREATMENT PLAN: Continue supportive psychotherapy efforts and medications as indicated.   Pharmacological and Non-Pharmacological treatment options discussed during today's visit. " Patient/Guardian acknowledged and verbally consented with current treatment plan and was educated on the importance of compliance with treatment and follow-up appointments.      MEDICATION ISSUES:  Discussed medication options and treatment plan of prescribed medication as well as the risks, benefits, any black box warnings, and side effects including potential falls, possible impaired driving, and metabolic adversities among others. Patient is agreeable to call the office with any worsening of symptoms or onset of side effects, or if any concerns or questions arise.  The contact information for the office is made available to the patient. Patient is agreeable to call 911 or go to the nearest ER should they begin having any SI/HI, or if any urgent concerns arise. No medication side effects or related complaints today.     Continue with Zoloft 50 mg tablet take 1 oral tablet at bedtime with food  Continue buspirone 5 mg tablet take 1 tablet in the morning and 1 in the evening for anxiety/vocal tic      MEDS ORDERED DURING VISIT:  New Medications Ordered This Visit   Medications    busPIRone (BUSPAR) 5 MG tablet     Sig: Take one oral tablet in the morning and in the evening.     Dispense:  60 tablet     Refill:  5       Follow Up Appointment:   Return in about 14 weeks (around 6/16/2025) for Recheck, Video visit.               This document has been electronically signed by ARTURO Valdes  March 10, 2025 08:54 EDT    Dictated Utilizing Dragon Dictation: Part of this note may be an electronic transcription/translation of spoken language to printed text using the Dragon Dictation System.

## 2025-03-10 NOTE — TELEPHONE ENCOUNTER
Caller: Anaid Butt    Relationship: Emergency Contact    Best call back number: [phone]     Requested Prescriptions:   Requested Prescriptions     Pending Prescriptions Disp Refills    montelukast (SINGULAIR) 10 MG tablet 30 tablet 5     Sig: Take 1 tablet by mouth Every Night.        Pharmacy where request should be sent: [id number] - [place] [address] - [phone]  - [phone] FX     Last office visit with prescribing clinician: 10/10/2024   Last telemedicine visit with prescribing clinician: Visit date not found   Next office visit with prescribing clinician: 4/17/2025     Additional details provided by patient: LESS THEN 3 DAYS LEFT    Does the patient have less than a 3 day supply:  [x] Yes  [] No    Would you like a call back once the refill request has been completed: [] Yes [x] No    If the office needs to give you a call back, can they leave a voicemail: [] Yes [x] No    Giovanni Dan Rep   03/10/25 13:11 EDT

## 2025-03-13 ENCOUNTER — TELEMEDICINE (OUTPATIENT)
Dept: PSYCHIATRY | Facility: CLINIC | Age: 55
End: 2025-03-13
Payer: COMMERCIAL

## 2025-03-13 DIAGNOSIS — F41.1 GENERALIZED ANXIETY DISORDER: Primary | ICD-10-CM

## 2025-03-13 NOTE — PROGRESS NOTES
Dali Batista is a 55 y.o. female presenting to [facility] (through [facility]), [address], [address] using a secure [employer] Video Visit through [employer] for assessment with Radha Figueroa LCSW. Therapist is located in a secure location within their home in [place].The patient is seen remotely in their home using [facility] My Chart. Patient is being seen via telehealth and stated they are in a secure environment for this session. The patient's condition being diagnosed/treated is appropriate for telemedicine. The provider identified herself as well as her credentials. The patient and/or patients guardian consent to be seen remotely, and when consent is given they understand that the consent allows for patient identifiable information to be sent to a third party as needed. They may refuse to be seen remotely at any time. The electronic data is encrypted and password protected, and the patient has been advised of the potential risks to privacy not withstanding such measures.    You have chosen to receive care through a telehealth visit. Do you consent to use a video/audio connection for your medical care today?   Yes    Time In: 3:31  Time Out: 3:51    Chief Complaint: Anxiety      Progress Note / Session Description: Patient reports having positive experience attending first week at skill building program.  Patient reports that he experienced some anxiety at the onset of the program but states this cleared up quickly.  Patient reports ongoing feelings of excitement related to engaging in the program and securing employment.  Majority of session spent with patient processing recent emotions, particularly related to difficult interaction between sister and daughter.  Therapist normalized and validated patient emotions and supported patient and continued strength based therapy in order to identify personal strengths which will be supportive  in managing new life experiences.  Patient reports ongoing benefit to care, return in 3 weeks.    Clinical Intervention / Modalities Utilized: Emotional processing, strength-based therapy    Assessment:  Patient appears to be maintaining stability and continues to be engaged in treatment. Patient is making reasonable progress towards goals but continues to struggle with anxiety which impacts functioning across life domains. As a result, patient can be expected to continue benefiting from treatment and would likely be at an increased risk of decompensation otherwise.     Diagnosis:     ICD-10-CM ICD-9-CM   1. Generalized anxiety disorder  F41.1 300.02        Mental Status Exam:   MENTAL STATUS EXAM   General Appearance:  Cleanly groomed and dressed  Eye Contact:  Good eye contact  Attitude:  Cooperative  Speech:  Normal rate, tone, volume  Mood and affect:  Normal, pleasant  Hopelessness:  Denies  Loneliness: Denies  Thought Process:  Logical  Associations/ Thought Content:  No delusions  Hallucinations:  None  Suicidal Ideations:  Not present  Homicidal Ideation:  Not present  Sensorium:  Alert and clear  Orientation:  Person, place, time and situation  Immediate Recall, Recent, and Remote Memory:  Intact  Attention Span/ Concentration:  Good and easily distracted  Fund of Knowledge:  Limited  Intellectual Functioning:  Previous IDD dx  Insight:  Limited  Judgement:  Limited  Reliability:  Good  Impulse Control:  Good       Functional Status: Mild/Moderate impairment     Prognosis: Good with Ongoing Treatment     Plan:   Patient will continue in individual outpatient therapy with focus on improved functioning and coping skills, maintaining stability, and avoiding decompensation and the need for higher level of care.     Patient will contact this office ([facility] at [phone]), call 911 or present to the nearest emergency room should suicidal or homicidal ideations occur. Provide  Cognitive Behavioral Therapy and Solution Focused Therapy to improve functioning, maintain stability, and avoid decompensation and the need for higher level of care.      Return onFOLLOWUP@ , or earlier if symptoms worsen or fail to improve.        This document has been electronically signed by Radha Figueroa LCSW  March 13, 2025 15:45 EDT    Dictated Utilizing Dragon Dictation: Part of this note may be an electronic transcription/translation of spoken language to printed text using the Dragon Dictation System.

## 2025-03-14 DIAGNOSIS — J30.2 SEASONAL ALLERGIES: ICD-10-CM

## 2025-03-14 RX ORDER — MONTELUKAST SODIUM 10 MG/1
10 TABLET ORAL NIGHTLY
Qty: 30 TABLET | Refills: 5 | Status: SHIPPED | OUTPATIENT
Start: 2025-03-14

## 2025-04-03 ENCOUNTER — TELEMEDICINE (OUTPATIENT)
Dept: PSYCHIATRY | Facility: CLINIC | Age: 55
End: 2025-04-03
Payer: COMMERCIAL

## 2025-04-03 DIAGNOSIS — F41.1 GENERALIZED ANXIETY DISORDER: Primary | ICD-10-CM

## 2025-04-03 NOTE — PROGRESS NOTES
Dali Batista is a 55 y.o. female presenting to [facility] (through [facility]), [address], [address] using a secure MyChart Video Visit through [employer] for assessment with Radha Figueroa LCSW. Patient is being seen via telehealth and stated they are in a secure environment for this session. The patient's condition being diagnosed/treated is appropriate for telemedicine. The provider identified self as well as credentials. The patient, and/or patients guardian, consent to be seen remotely, and when consent is given they understand that the consent allows for patient identifiable information to be sent to a third party as needed. They may refuse to be seen remotely at any time. The electronic data is encrypted and password protected, and the patient and/or guardian has been advised of the potential risks to privacy not withstanding such measures.    You have chosen to receive care through a telehealth visit. Do you consent to use a video/audio connection for your medical care today?   Yes, session completed over the phone due to patient having hardware issues connecting to session.     Time In: 3:35  Time Out: 3:52    Chief Complaint: Anxiety      Progress Note / Session Description: Patient reports they are settling in well with new training program and feeling safe and secure.  Patient reports no specific concerns at this time.  Majority of session spent with therapist supporting patient in processing recent experiences, particularly at work program.  Therapist supported patient in discussing upcoming events which patient is looking forward to and encouraged patient to continue seeking support as needed.  Also encouraged patient to keep notes about any concerns which arise between appointments.  Patient reports ongoing benefit to engaging in brief therapy sessions on a consistent basis.  Patient is scheduled to return in 3 weeks.    Clinical  Intervention / Modalities Utilized: Emotional processing, solution focused therapy    Assessment:  Patient appears to be maintaining stability and continues to be engaged in treatment. Patient is making reasonable progress towards goals but continues to struggle with anxiety which impacts functioning across life domains. As a result, patient can be expected to continue benefiting from treatment and would likely be at an increased risk of decompensation otherwise.     Diagnosis:     ICD-10-CM ICD-9-CM   1. Generalized anxiety disorder  F41.1 300.02        Mental Status Exam:   MENTAL STATUS EXAM   General Appearance:  Cleanly groomed and dressed  Eye Contact:  Good eye contact  Attitude:  Cooperative  Speech:  Normal rate, tone, volume  Mood and affect:  Normal, pleasant  Hopelessness:  Denies  Loneliness: Denies  Thought Process:  Logical  Associations/ Thought Content:  No delusions  Hallucinations:  None  Suicidal Ideations:  Not present  Homicidal Ideation:  Not present  Sensorium:  Alert and clear  Orientation:  Person, place, time and situation  Immediate Recall, Recent, and Remote Memory:  Intact  Attention Span/ Concentration:  Good and easily distracted  Fund of Knowledge:  Limited  Intellectual Functioning:  Previous IDD dx  Insight:  Limited  Judgement:  Limited  Reliability:  Good  Impulse Control:  Good       Functional Status: Mild Impairment     Prognosis: Good with Ongoing Treatment     Plan:   Patient will continue in individual outpatient therapy with focus on improved functioning and coping skills, maintaining stability, and avoiding decompensation and the need for higher level of care.     Patient will contact this office ([facility] at [phone]), call 911 or present to the nearest emergency room should suicidal or homicidal ideations occur. Provide Cognitive Behavioral Therapy and Solution Focused Therapy to improve functioning, maintain stability, and avoid  decompensation and the need for higher level of care.      Return onFOLLOWUP@ , or earlier if symptoms worsen or fail to improve.        This document has been electronically signed by Radha Figueroa LCSW  April 3, 2025 16:18 EDT    Dictated Utilizing Dragon Dictation: Part of this note may be an electronic transcription/translation of spoken language to printed text using the Dragon Dictation System.

## 2025-04-24 ENCOUNTER — TELEPHONE (OUTPATIENT)
Dept: PSYCHIATRY | Facility: CLINIC | Age: 55
End: 2025-04-24
Payer: COMMERCIAL

## 2025-04-24 ENCOUNTER — TELEMEDICINE (OUTPATIENT)
Dept: PSYCHIATRY | Facility: CLINIC | Age: 55
End: 2025-04-24
Payer: COMMERCIAL

## 2025-04-24 DIAGNOSIS — F41.1 GENERALIZED ANXIETY DISORDER: Primary | ICD-10-CM

## 2025-04-24 NOTE — PROGRESS NOTES
Dali Batista is a 55 y.o. female presenting to [facility] (through [facility]), [address] [facility], [address] using a secure MyChart Video Visit through [employer] for assessment with Radha Figueroa LCSW. Patient is being seen via telehealth and stated they are in a secure environment for this session. The patient's condition being diagnosed/treated is appropriate for telemedicine. The provider identified self as well as credentials. The patient, and/or patients guardian, consent to be seen remotely, and when consent is given they understand that the consent allows for patient identifiable information to be sent to a third party as needed. They may refuse to be seen remotely at any time. The electronic data is encrypted and password protected, and the patient and/or guardian has been advised of the potential risks to privacy not withstanding such measures.    You have chosen to receive care through a telehealth visit. Do you consent to use a video/audio connection for your medical care today?   Yes, session was completed over the phone due to hardware issues.    Time In: 1:27  Time Out: 1:45    Chief Complaint: Anxiety      Progress Note / Session Description: Patient continues to report positive experience engaging in current work training program.  Patient states they are excited to continue and looking forward to the program closing in June.  Patient reports they are engaging positively with peers, feeling confident about ability to advocate well for self, and enjoying training.  Majority of session spent with patient verbally processing recent experiences, particularly social interactions with peers.  Therapist reminded patient the importance of engaging in self-care and self compassion.  Reminded patient to make note of any mental health symptoms which arise at this time.  Therapist supported patient in processing feelings of homesickness and  missing pets/family.  Patient was receptive and engaged, reports benefit to receiving supportive responses in a therapeutic setting.  Patient is scheduled to return in 3 weeks.    Clinical Intervention / Modalities Utilized: Emotional processing, solution focused therapy    Assessment:  Patient appears to be maintaining stability and continues to be engaged in treatment. Patient is making reasonable progress towards goals but continues to struggle with symptoms of anxiety which impact functioning across life domains. As a result, patient can be expected to continue benefiting from treatment and would likely be at an increased risk of decompensation otherwise.     Diagnosis:     ICD-10-CM ICD-9-CM   1. Generalized anxiety disorder  F41.1 300.02        Mental Status Exam:   MENTAL STATUS EXAM   Attitude:  Cooperative  Speech:  Normal rate, tone, volume  Mood and affect:  Normal, pleasant  Hopelessness:  Denies  Loneliness: Denies  Thought Process:  Logical  Associations/ Thought Content:  No delusions  Hallucinations:  None  Suicidal Ideations:  Not present  Homicidal Ideation:  Not present  Sensorium:  Alert and clear  Orientation:  Person, place, time and situation  Immediate Recall, Recent, and Remote Memory:  Intact  Attention Span/ Concentration:  Easily distracted  Fund of Knowledge:  Limited  Intellectual Functioning:  Previous IDD dx  Insight:  Limited  Judgement:  Limited  Reliability:  Good  Impulse Control:  Good       Functional Status: Mild impairment     Prognosis: Good with Ongoing Treatment     Plan:   Patient will continue in individual outpatient therapy with focus on improved functioning and coping skills, maintaining stability, and avoiding decompensation and the need for higher level of care.     Patient will contact this office ([facility] at [phone]), call 911 or present to the nearest emergency room should suicidal or homicidal ideations occur. Provide Cognitive  Behavioral Therapy and Solution Focused Therapy to improve functioning, maintain stability, and avoid decompensation and the need for higher level of care.      Return onFOLLOWUP@ , or earlier if symptoms worsen or fail to improve.        This document has been electronically signed by Radha Figueroa LCSW  April 24, 2025 13:42 EDT    Dictated Utilizing Dragon Dictation: Part of this note may be an electronic transcription/translation of spoken language to printed text using the Dragon Dictation System.

## 2025-04-24 NOTE — TELEPHONE ENCOUNTER
Pt sister Anaid called stating that you will need to call the  number for the patient visit today # [phone]

## 2025-05-15 ENCOUNTER — TELEMEDICINE (OUTPATIENT)
Dept: PSYCHIATRY | Facility: CLINIC | Age: 55
End: 2025-05-15
Payer: COMMERCIAL

## 2025-05-15 DIAGNOSIS — F43.29 GRIEF REACTION WITH PROLONGED BEREAVEMENT: ICD-10-CM

## 2025-05-15 DIAGNOSIS — F41.1 GENERALIZED ANXIETY DISORDER: Primary | ICD-10-CM

## 2025-05-15 NOTE — PROGRESS NOTES
Dali Batista is a 55 y.o. female presenting to [facility] (through [facility]), [address] [facility], [address] using a secure MyChart Video Visit through [employer] for assessment with Radha Figueroa LCSW. Patient is being seen via telehealth and stated they are in a secure environment for this session. The patient's condition being diagnosed/treated is appropriate for telemedicine. The provider identified self as well as credentials. The patient, and/or patients guardian, consent to be seen remotely, and when consent is given they understand that the consent allows for patient identifiable information to be sent to a third party as needed. They may refuse to be seen remotely at any time. The electronic data is encrypted and password protected, and the patient and/or guardian has been advised of the potential risks to privacy not withstanding such measures.    You have chosen to receive care through a telehealth visit. Do you consent to use a video/audio connection for your medical care today?   Yes, completed over the phone due to connectivity issues at patient's current living situation.     Time In: 1:30   Time Out: 1:48     Chief Complaint: Anxiety / Grief     Progress Note / Session Description: Patient reports they are continuing to do well at current job training placement. States they are performing highly and feel confident in their ability to complete the program and secure employment which will be positive and supportive long term. Majority of session spent with patient processing emotions related to grief after mother's day holiday - particularly due to loss of parents and distance from patient's own daughter. Patient expresses relief at supportive relationship with sister. Patient receptive and engaged, return in three weeks     Clinical Intervention / Modalities Utilized: emotional processing, solution focused care     Assessment:  Patient  appears to be maintaining stability and continues to be engaged in treatment. Patient is making reasonable progress towards goals but continues to struggle with anxiety and grief which impact functioning across life domains. As a result, patient can be expected to continue benefiting from treatment and would likely be at an increased risk of decompensation otherwise.     Diagnosis:     ICD-10-CM ICD-9-CM   1. 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        Mental Status Exam:   MENTAL STATUS EXAM   Attitude:  Cooperative  Speech:  Normal rate, tone, volume  Mood and affect:  Normal, pleasant  Hopelessness:  Denies  Loneliness: Denies  Thought Process:  Logical  Associations/ Thought Content:  No delusions  Hallucinations:  None  Suicidal Ideations:  Not present  Homicidal Ideation:  Not present  Sensorium:  Alert and clear  Orientation:  Person, place, time and situation  Immediate Recall, Recent, and Remote Memory:  Intact  Attention Span/ Concentration:  Easily distracted  Fund of Knowledge:  Limited  Intellectual Functioning:  Previous IDD dx  Insight:  Limited  Judgement:  Limited  Reliability:  Good  Impulse Control:  Good       Functional Status: Mild impairment     Prognosis: Good with Ongoing Treatment     Plan:   Patient will continue in individual outpatient therapy with focus on improved functioning and coping skills, maintaining stability, and avoiding decompensation and the need for higher level of care.     Patient will contact this office ([facility] at [phone]), call 911 or present to the nearest emergency room should suicidal or homicidal ideations occur. Provide Cognitive Behavioral Therapy and Solution Focused Therapy to improve functioning, maintain stability, and avoid decompensation and the need for higher level of care.      Return onFOLLOWUP@ , or earlier if symptoms worsen or fail to  improve.        This document has been electronically signed by Radha Figueroa LCSW  May 15, 2025 13:41 EDT    Dictated Utilizing Dragon Dictation: Part of this note may be an electronic transcription/translation of spoken language to printed text using the Dragon Dictation System.

## 2025-05-15 NOTE — PLAN OF CARE
Patient is enrolled in therapy to address symptoms of anxiety which have been exasperated by recent life changes. Therapist to support patient in reducing impact to functioning with CBT, emotional processing, and humanistic therapy. Patient is engaged in treatment at this time.

## 2025-05-15 NOTE — TREATMENT PLAN
Multi-Disciplinary Problems (from Behavioral Health Treatment Plan)      Active Problems       Problem: Anxiety  Start Date: 05/15/25      Problem Details: The patient self-scales this problem as a 5 with 10 being the worst.          Goal Priority Start Date Expected End Date End Date    Patient will develop and implement behavioral and cognitive strategies to reduce anxiety and irrational fears. -- 05/15/25 11/13/25 --    Goal Details: Progress toward goal:  Not appropriate to rate progress toward goal since this is the initial treatment plan.        Goal Intervention Frequency Start Date End Date    Help patient explore past emotional issues in relation to present anxiety. Q3 Weeks 05/15/25 --    Intervention Details: Duration of treatment until remission of symptoms.        Goal Intervention Frequency Start Date End Date    Help patient develop an awareness of their cognitive and physical responses to anxiety. Q3 Weeks 05/15/25 --    Intervention Details: Duration of treatment until remission of symptoms.                        Reviewed By       Radha Figueroa LCSW 05/15/25 1410                     I have discussed and reviewed this treatment plan with the patient.

## 2025-06-05 ENCOUNTER — TELEMEDICINE (OUTPATIENT)
Dept: PSYCHIATRY | Facility: CLINIC | Age: 55
End: 2025-06-05
Payer: COMMERCIAL

## 2025-06-05 DIAGNOSIS — F41.1 GENERALIZED ANXIETY DISORDER: Primary | ICD-10-CM

## 2025-06-05 NOTE — PROGRESS NOTES
Dali Batista is a 55 y.o. female presenting to [facility] (through [facility]), [address] [facility], [address] using a secure "[employer]hart Video Visit through [employer] for assessment with Radha Figureoa LCSW. Patient is being seen via telehealth and stated they are in a secure environment for this session. The patient's condition being diagnosed/treated is appropriate for telemedicine. The provider identified self as well as credentials. The patient, and/or patients guardian, consent to be seen remotely, and when consent is given they understand that the consent allows for patient identifiable information to be sent to a third party as needed. They may refuse to be seen remotely at any time. The electronic data is encrypted and password protected, and the patient and/or guardian has been advised of the potential risks to privacy not withstanding such measures.    You have chosen to receive care through a telehealth visit. Do you consent to use a video/audio connection for your medical care today?   Yes, over the phone due to computer issues.    Time In: 12:29  Time Out: 12:47     Chief Complaint: Anxiety      Progress Note / Session Description: Patient reports they are doing well as they began their last weeks at job training program.  Expresses feelings of feeling excitement related to positive remarks from all  and teachers.  Patient states they are doing well across all friends.  Reports anxiety has not been overwhelming or exhausting.  Patient states they are looking forward to being home and expresses feelings of excitement related to upcoming life changes.  Therapist encouraged ongoing self-care and self compassion.  Patient is scheduled to return in 5 weeks.    Clinical Intervention / Modalities Utilized: Emotional processing, humanistic therapy    Assessment:  Patient appears to be maintaining stability and continues to be engaged in  treatment. Patient is making reasonable progress towards goals but continues to struggle with anxiety which impacts functioning across life domains. As a result, patient can be expected to continue benefiting from treatment and would likely be at an increased risk of decompensation otherwise.     Diagnosis:     ICD-10-CM ICD-9-CM   1. Generalized anxiety disorder  F41.1 300.02        Mental Status Exam:   MENTAL STATUS EXAM   Attitude:  Cooperative  Speech:  Normal rate, tone, volume  Mood and affect:  Normal, pleasant  Hopelessness:  Denies  Loneliness: Denies  Thought Process:  Logical  Associations/ Thought Content:  No delusions  Hallucinations:  None  Suicidal Ideations:  Not present  Homicidal Ideation:  Not present  Sensorium:  Alert and clear  Orientation:  Person, place, time and situation  Immediate Recall, Recent, and Remote Memory:  Intact  Attention Span/ Concentration:  Easily distracted  Fund of Knowledge:  Limited  Intellectual Functioning:  Previous IDD dx  Insight:  Limited  Judgement:  Limited  Reliability:  Good  Impulse Control:  Good       Functional Status: Mild impairment     Prognosis: Good with Ongoing Treatment     Plan:   Patient will continue in individual outpatient therapy with focus on improved functioning and coping skills, maintaining stability, and avoiding decompensation and the need for higher level of care.     Patient will contact this office ([facility] at [phone]), call 911 or present to the nearest emergency room should suicidal or homicidal ideations occur. Provide Cognitive Behavioral Therapy and Solution Focused Therapy to improve functioning, maintain stability, and avoid decompensation and the need for higher level of care.      Return onFOLLOWUP@ , or earlier if symptoms worsen or fail to improve.        This document has been electronically signed by Radha Figueroa LCSW  June 5, 2025 13:36 EDT    Dictated Utilizing Dragon Dictation: Part  of this note may be an electronic transcription/translation of spoken language to printed text using the Dragon Dictation System.

## 2025-06-16 ENCOUNTER — TELEPHONE (OUTPATIENT)
Dept: PSYCHIATRY | Facility: CLINIC | Age: 55
End: 2025-06-16

## 2025-06-16 ENCOUNTER — TELEMEDICINE (OUTPATIENT)
Dept: PSYCHIATRY | Facility: CLINIC | Age: 55
End: 2025-06-16
Payer: COMMERCIAL

## 2025-06-16 DIAGNOSIS — F33.1 MAJOR DEPRESSIVE DISORDER, RECURRENT, MODERATE: ICD-10-CM

## 2025-06-16 DIAGNOSIS — F70 INTELLECTUAL DEVELOPMENTAL DISORDER, MILD: ICD-10-CM

## 2025-06-16 DIAGNOSIS — F95.8 VOCAL TIC DISORDER: ICD-10-CM

## 2025-06-16 DIAGNOSIS — F41.1 GENERALIZED ANXIETY DISORDER: Primary | ICD-10-CM

## 2025-06-16 RX ORDER — BUSPIRONE HYDROCHLORIDE 5 MG/1
TABLET ORAL
Qty: 60 TABLET | Refills: 11 | Status: SHIPPED | OUTPATIENT
Start: 2025-06-16

## 2025-06-16 RX ORDER — LORATADINE 10 MG/1
TABLET ORAL
COMMUNITY
Start: 2025-05-20 | End: 2025-06-19 | Stop reason: SDUPTHER

## 2025-06-16 RX ORDER — VALSARTAN 40 MG/1
TABLET ORAL
COMMUNITY
Start: 2025-05-21 | End: 2025-06-19 | Stop reason: SDUPTHER

## 2025-06-16 NOTE — PROGRESS NOTES
"Mode of Visit: Video  Location of patient: -HOME-  Location of provider: +HOME+  You have chosen to receive care through a telehealth visit.  The patient has signed the video visit consent form.  The visit included audio and video interaction. No technical issues occurred during this visit.     PT Identifiers used: Name and .  Patient verbally confirmed consent for today's encounter  2025       You have chosen to receive care through a telehealth visit.  Do you consent to use a video/audio connection for your medical care today? Yes      Subjective   Dali Batista is a 55 y.o. female who presents today for follow up  She presents with her sister, Anaid, who she lives with.  There is a signed release on file.    Chief Complaint:  \" Anxiety, depression, intellectual disability, vocal tic\"    History of Present Illness:     History of Present Illness  This is a 3-month follow-up for above chief complaint.  Patient reports she has been in the [facility] for the last 3 months.  She came home for the month of  and will go back after .  She is enjoying the training center.  Sister reports that she now has guardianship for the patient and also she is the patient's conservator.  Patient also received her disability benefits.  They are also working now to apply for subsidized housing.  Previous PHQ-9 score is 0, today is scored 0.  Previous LAURENT-7 was scored at 4, today is scored at 0.  Patient has developed high blood pressure since prior encounter with undersigned.  She was placed originally on lisinopril but started having a cough.  She is now on losartan but continues to have a cough.    Patient also has been having some difficulty with back pain which seems to be exacerbated when she has severe coughing or sneezing.They were instructed to follow up with primary care.  Patient continues in virtual psychotherapy services and had recent appointment.  Patient reports she is " taking her medications as ordered and she feels like they are beneficial for anxiety, depression, and for the vocal tic exacerbation.  Sleep and appetite have been good.       Patient Health Questionnaire-9 (PHQ-9) (Depression Screening Tool)  Little interest or pleasure in doing things? (Patient-Rptd) Not at all   Feeling down, depressed, or hopeless? (Patient-Rptd) Not at all   PHQ-2 Total Score (Patient-Rptd) 0   Trouble falling or staying asleep, or sleeping too much? (Patient-Rptd) Not at all   Feeling tired or having little energy? (Patient-Rptd) Not at all   Poor appetite or overeating? (Patient-Rptd) Not at all   Feeling bad about yourself - or that you are a failure or have let yourself or your family down? (Patient-Rptd) Not at all   Trouble concentrating on things, such as reading the newspaper or watching television? (Patient-Rptd) Not at all   Moving or speaking so slowly that other people could have noticed? Or the opposite - being so fidgety or restless that you have been moving around a lot more than usual? (Patient-Rptd) Not at all   Thoughts that you would be better off dead, or of hurting yourself in some way? (Patient-Rptd) Not at all   PHQ-9 Total Score (Patient-Rptd) 0   If you checked off any problems, how difficult have these problems made it for you to do your work, take care of things at home, or get along with other people? (Patient-Rptd) Not difficult at all         PHQ-9 Total Score: (Patient-Rptd) 0       Generalized Anxiety Disorder 7-Item (LAURENT-7) Screening Tool  Feeling nervous, anxious or on edge: (Patient-Rptd) Not at all  Not being able to stop or control worrying: (Patient-Rptd) Not at all  Worrying too much about different things: (Patient-Rptd) Not at all  Trouble Relaxing: (Patient-Rptd) Not at all  Being so restless that it is hard to sit still: (Patient-Rptd) Not at all  Feeling afraid as if something awful might happen: (Patient-Rptd) Not at all  Becoming easily annoyed or  irritable: (Patient-Rptd) Not at all  LAURENT 7 Total Score: (Patient-Rptd) 0  If you checked any problems, how difficult have these problems made it for you to do your work, take care of things at home, or get along with other people: (Patient-Rptd) Not difficult at all         The following portions of the patient's history were reviewed and updated as appropriate: allergies, current medications, past family history, past medical history, past social history, past surgical history and problem list.    Past Psychiatric History:  Began Treatment:As a child patient reportedly was in special needs classes.  Diagnoses:Depression, Anxiety, and vocal tic disorder, mild intellectual disability, full scale IQ measured at 63 by psychological assessment at [facility] in [facility] in 2024  Psychiatrist:Most recently patient was treated at [facility] in [facility].  Saw a psychiatrist there virtually for medication management.  Therapist:Most recently patient was seeing a therapist named Jaclyn at [facility] in [facility].  Admission History:Patient reported she was admitted to [facility] inpatient unit after the death of her mother due to overwhelming grief.  Medication Trials:Remeron gave her increased appetite, clonidine (?), Zoloft at 50 mg as of initial encounter on 2024.  This medicine seems to be helping some.  Self Harm: Denies  Suicide Attempts:Denies   Psychosis, Anxiety, Depression: Denies  Patient denied any history of a head injury or seizure  Denied any history of hallucinations or psychosis    Past Medical History:  Past Medical History:   Diagnosis Date    Anxiety     Depression     Mild cognitive impairment     Vocal tic disorder        Substance Abuse History:   Types:Denies all, including illicit       Social History:  Social History     Socioeconomic History    Marital status: Single    Number of children: 1    Highest education level:  High school graduate   Tobacco Use    Smoking status: Never    Smokeless tobacco: Never   Vaping Use    Vaping status: Never Used   Substance and Sexual Activity    Alcohol use: Not Currently    Drug use: Never    Sexual activity: Not Currently     Partners: Male   Current support system consists of her sister Anaid.  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  She went through [employer] in the past to find work.    Family History:  Family History   Problem Relation Age of Onset    Hypertension Mother     Hyperlipidemia Mother     Osteoporosis Mother     Stroke Mother     Diabetes type II Mother     Heart attack Father     Hypertension Father     Thyroid disease Maternal Grandmother     Dementia Maternal Grandmother     Cancer Paternal Grandmother     Dementia Paternal Grandmother     Breast cancer Neg Hx     Ovarian cancer Neg Hx        Past Surgical History:  Past Surgical History:   Procedure Laterality Date     SECTION      COLONOSCOPY      FRACTURE SURGERY  over 10 years    fell and broke ankle/foot       Problem List:  There is no problem list on file for this patient.      Allergy:   No Known Allergies     Current Medications:   Current Outpatient Medications   Medication Sig Dispense Refill    busPIRone (BUSPAR) 5 MG tablet Take one oral tablet in the morning and in the evening. 60 tablet 11    famotidine (Pepcid) 20 MG tablet Take 1 tablet by mouth 2 (Two) Times a Day As Needed for Heartburn. 60 tablet 3    Fluticasone Furoate 100 MCG/ACT aerosol powder        loratadine (CLARITIN) 10 MG tablet       montelukast (SINGULAIR) 10 MG tablet Take 1 tablet by mouth Every Night. 30 tablet 5    sertraline (ZOLOFT) 50 MG tablet Take one oral tablet at bedtime with food 90 tablet 3    valsartan (DIOVAN) 40 MG tablet        No current facility-administered medications for this visit.       Review of Systems:    Review of Systems   All other  systems reviewed and are negative.        Physical Exam:   Physical Exam  Constitutional:       Appearance: Normal appearance. She is well-developed and well-groomed.   HENT:      Head: Normocephalic.   Neurological:      Mental Status: She is alert.   Psychiatric:         Attention and Perception: Attention and perception normal.         Mood and Affect: Mood and affect normal.         Speech: Speech normal.         Behavior: Behavior normal. Behavior is cooperative.         Thought Content: Thought content normal.      Comments:           Vitals:  There were no vitals taken for this visit. There is no height or weight on file to calculate BMI.  Due to extenuating circumstances and possible current health risks associated with the patient being present in a clinical setting (with current health restrictions in place in regards to possible COVID 19 transmission/exposure), the patient was seen remotely today via a MyChart Video Visit through [facility] and telephone encounter.  Unable to obtain vital signs due to nature of remote visit.  Height stated at 62 inches.  Weight stated at 123 pounds.    Last 3 Blood Pressure Readings:  BP Readings from Last 3 Encounters:   10/10/24 126/78   09/12/24 122/68            Mental Status Exam:   Hygiene:   good  Cooperation:  Cooperative  Eye Contact:  Good  Psychomotor Behavior:  Appropriate  Affect:  Full range  Mood: euthymic  Hopelessness: Denies  Speech:  Normal  Thought Process:  Goal directed and Linear  Thought Content:  Normal  Suicidal:  None  Homicidal:  None  Hallucinations:  None  Delusion:  None  Memory:  Intact  Orientation:  Person, Place, Time, and Situation  Reliability:  good  Insight:  Good  Judgement:  Good  Impulse Control:  Good  Physical/Medical Issues:  No        Lab Results:   No visits with results within 1 Month(s) from this visit.   Latest known visit with results is:   Lab Requisition on 02/03/2025   Component Date Value Ref Range Status    Case Report  "02/03/2025    Final                    Value:Surgical Pathology Report                         Case: [id number]                                  Authorizing Provider:  Medhat Mendez MD      Collected:           02/03/2025 10:06 AM          Ordering Location:     [facility]   Received:            02/03/2025 11:20 AM                                 LABORATORY                                                                   Pathologist:           Alvarado Leyva MD                                                        Specimen:    Scalp                                                                                      Clinical Information 02/03/2025    Final                    Value:Localized swelling, mass and lump, head      Final Diagnosis 02/03/2025    Final                    Value:SCALP MASS POSTERIOR:  Squamous lined cyst consistent with trichilemmal cyst      Gross Description 02/03/2025    Final                    Value:1. Scalp.  Received in formalin labeled \"scalp mass posterior\" is a 3.5 x 2.7 x 1.7 cm unoriented portion of soft tissue which is partially surfaced by a 3.5 x 1.7 cm ellipse of tan, hairbearing skin.  No gross skin lesions are identified.  Sectioning reveals a unilocular cyst entirely filled with soft, tan material.  A representative section is submitted in a single cassette.  LDP        Microscopic Description 02/03/2025    Final                    Value:The slides are reviewed and demonstrate histopathologic features supporting the above rendered diagnosis.                  Assessment & Plan   Diagnoses and all orders for this visit:    1. Generalized anxiety disorder (Primary)  -     busPIRone (BUSPAR) 5 MG tablet; Take one oral tablet in the morning and in the evening.  Dispense: 60 tablet; Refill: 11    2. Vocal tic disorder    3. Major depressive disorder, recurrent, moderate    4. Intellectual developmental disorder, mild              Visit Diagnoses:    " "ICD-10-CM ICD-9-CM   1. Generalized anxiety disorder  F41.1 300.02   2. Vocal tic disorder  F95.8 307.20   3. Major depressive disorder, recurrent, moderate  F33.1 296.32   4. Intellectual developmental disorder, mild  F70 317               GOALS:  Short Term Goals: Patient will be compliant with medication, and patient will have no significant medication related side effects.  Patient will be engaged in psychotherapy as indicated.  Patient will report subjective improvement of symptoms.  Long term goals: To stabilize mood and treat/improve subjective symptoms, the patient will stay out of the hospital, the patient will be at an optimal level of functioning, and the patient will take all medications as prescribed.  The patient/guardian verbalized understanding and agreement with goals that were mutually set.      RISK ASSESSMENT  Current harm-to-self risk is reported by pt as \"none.\"  Current lztu-or-vvsasw risk is reported by pt as \"none.\"   No suicidal thoughts, intent, plan is appreciated by this provider on this date of exam.   No homicidal thoughts, intent, plan is appreciated by this provider on this date.      TREATMENT PLAN: Continue supportive psychotherapy efforts and medications as indicated.   Pharmacological and Non-Pharmacological treatment options discussed during today's visit. Patient/Guardian acknowledged and verbally consented with current treatment plan and was educated on the importance of compliance with treatment and follow-up appointments.      MEDICATION ISSUES:  Discussed medication options and treatment plan of prescribed medication as well as the risks, benefits, any black box warnings, and side effects including potential falls, possible impaired driving, and metabolic adversities among others. Patient is agreeable to call the office with any worsening of symptoms or onset of side effects, or if any concerns or questions arise.  The contact information for the office is made available to " the patient. Patient is agreeable to call 911 or go to the nearest ER should they begin having any SI/HI, or if any urgent concerns arise. No medication side effects or related complaints today.     Continue with Zoloft 50 mg tablet take 1 oral tablet at bedtime with food  Continue buspirone 5 mg tablet take 1 tablet in the morning and 1 in the evening for anxiety/vocal tic  Follow-up with primary care regarding hypertension, back pain    MEDS ORDERED DURING VISIT:  New Medications Ordered This Visit   Medications    busPIRone (BUSPAR) 5 MG tablet     Sig: Take one oral tablet in the morning and in the evening.     Dispense:  60 tablet     Refill:  11       Follow Up Appointment:   Return in about 15 weeks (around 9/29/2025) for Recheck, Video visit.               This document has been electronically signed by ARTURO Valdes  June 16, 2025 08:55 EDT    Dictated Utilizing Dragon Dictation: Part of this note may be an electronic transcription/translation of spoken language to printed text using the Dragon Dictation System.

## 2025-06-16 NOTE — PATIENT INSTRUCTIONS
For concerns or needing assistance call the [facility] at [phone]  Continue with Zoloft 50 mg tablet take 1 oral tablet at bedtime with food  Continue buspirone 5 mg tablet take 1 tablet in the morning and 1 in the evening for anxiety/vocal tic  Follow-up with primary care regarding hypertension, back pain

## 2025-06-16 NOTE — TELEPHONE ENCOUNTER
Wilda Matamoros, Vish Nye, Giovanni Rep  Please contact pt's sister re: guardianship/conservator paperwork.  Needs to be in chart.                      I LVM for patient or sister Anaid Butt to call this office back to receive info as to how to send this office guardianship/conservator paperwork for patient's chart.   Please provide email or fax, upon return call.        Thank You

## 2025-06-17 NOTE — TELEPHONE ENCOUNTER
Patient sister, Anaid Lopez, called back - she is to fax paperwork to this office today.    Thank you

## 2025-06-19 DIAGNOSIS — J30.2 SEASONAL ALLERGIES: ICD-10-CM

## 2025-06-19 RX ORDER — LORATADINE 10 MG/1
10 TABLET ORAL DAILY
Qty: 90 TABLET | Refills: 3 | Status: SHIPPED | OUTPATIENT
Start: 2025-06-19

## 2025-06-19 RX ORDER — VALSARTAN 40 MG/1
40 TABLET ORAL DAILY
Qty: 90 TABLET | Refills: 3 | Status: SHIPPED | OUTPATIENT
Start: 2025-06-19

## 2025-06-19 RX ORDER — MONTELUKAST SODIUM 10 MG/1
10 TABLET ORAL NIGHTLY
Qty: 30 TABLET | Refills: 5 | Status: SHIPPED | OUTPATIENT
Start: 2025-06-19

## 2025-06-27 ENCOUNTER — OFFICE VISIT (OUTPATIENT)
Dept: INTERNAL MEDICINE | Age: 55
End: 2025-06-27
Payer: COMMERCIAL

## 2025-06-27 VITALS
WEIGHT: 155.8 LBS | OXYGEN SATURATION: 99 % | DIASTOLIC BLOOD PRESSURE: 82 MMHG | BODY MASS INDEX: 28.5 KG/M2 | TEMPERATURE: 98.4 F | HEART RATE: 80 BPM | SYSTOLIC BLOOD PRESSURE: 136 MMHG

## 2025-06-27 DIAGNOSIS — R05.3 CHRONIC COUGH: ICD-10-CM

## 2025-06-27 DIAGNOSIS — I10 BENIGN ESSENTIAL HTN: Primary | ICD-10-CM

## 2025-06-27 PROCEDURE — 1126F AMNT PAIN NOTED NONE PRSNT: CPT | Performed by: INTERNAL MEDICINE

## 2025-06-27 PROCEDURE — 99214 OFFICE O/P EST MOD 30 MIN: CPT | Performed by: INTERNAL MEDICINE

## 2025-06-27 RX ORDER — BENZONATATE 100 MG/1
100 CAPSULE ORAL 3 TIMES DAILY PRN
Qty: 60 CAPSULE | Refills: 3 | Status: SHIPPED | OUTPATIENT
Start: 2025-06-27

## 2025-06-27 RX ORDER — AMLODIPINE BESYLATE 5 MG/1
5 TABLET ORAL DAILY
Qty: 90 TABLET | Refills: 3 | Status: SHIPPED | OUTPATIENT
Start: 2025-06-27

## 2025-06-27 NOTE — PROGRESS NOTES
Follow Up Office Visit      Date: 2025   Patient Name: Dali Batista  : 1970   MRN: [id number]     Chief Complaint:    Chief Complaint   Patient presents with    Hypertension    Cough     Switched from lisinopril to valsartan but still present       History of Present Illness: Dali Batista is a 55 y.o. female who is here today to follow up with persistent cough.  Has recently been changed from lisinopril to valsartan, but still having persistent cough.  Patient with recent diagnosis of hypertension.          Subjective      Past Medical History:   Diagnosis Date    Anxiety     Depression     Mild cognitive impairment     Vocal tic disorder        Past Surgical History:   Procedure Laterality Date     SECTION      COLONOSCOPY      FRACTURE SURGERY  over 10 years    fell and broke ankle/foot       Family History   Problem Relation Age of Onset    Hypertension Mother     Hyperlipidemia Mother     Osteoporosis Mother     Stroke Mother     Diabetes type II Mother     Heart attack Father     Hypertension Father     Thyroid disease Maternal Grandmother     Dementia Maternal Grandmother     Cancer Paternal Grandmother     Dementia Paternal Grandmother     Breast cancer Neg Hx     Ovarian cancer Neg Hx         Social History     Socioeconomic History    Marital status: Single    Number of children: 1    Highest education level: High school graduate   Tobacco Use    Smoking status: Never    Smokeless tobacco: Never   Vaping Use    Vaping status: Never Used   Substance and Sexual Activity    Alcohol use: Not Currently    Drug use: Never    Sexual activity: Not Currently     Partners: Male         I have reviewed the patients family history, social history, past medical history, past surgical history and have updated it as appropriate.     Medications:     Current Outpatient Medications:     busPIRone (BUSPAR) 5 MG tablet, Take one oral tablet in the morning and in the evening., Disp:  60 tablet, Rfl: 11    famotidine (Pepcid) 20 MG tablet, Take 1 tablet by mouth 2 (Two) Times a Day As Needed for Heartburn., Disp: 60 tablet, Rfl: 3    Fluticasone Furoate 100 MCG/ACT aerosol powder , , Disp: , Rfl:     loratadine (CLARITIN) 10 MG tablet, Take 1 tablet by mouth Daily., Disp: 90 tablet, Rfl: 3    montelukast (SINGULAIR) 10 MG tablet, Take 1 tablet by mouth Every Night., Disp: 30 tablet, Rfl: 5    sertraline (ZOLOFT) 50 MG tablet, Take one oral tablet at bedtime with food, Disp: 90 tablet, Rfl: 3    amLODIPine (NORVASC) 5 MG tablet, Take 1 tablet by mouth Daily., Disp: 90 tablet, Rfl: 3    benzonatate (Tessalon Perles) 100 MG capsule, Take 1 capsule by mouth 3 (Three) Times a Day As Needed for Cough., Disp: 60 capsule, Rfl: 3    Allergies:   No Known Allergies    Objective       Vital Signs:   Vitals:    06/27/25 1436   BP: 136/82   Pulse: 80   Temp: 98.4 °F (36.9 °C)   TempSrc: Infrared   SpO2: 99%   Weight: 70.7 kg (155 lb 12.8 oz)   PainSc: 0-No pain     Body mass index is 28.5 kg/m².    Physical Exam:  Physical Exam  Constitutional:       General: She is not in acute distress.     Appearance: Normal appearance. She is not ill-appearing.   HENT:      Nose: No congestion or rhinorrhea.      Mouth/Throat:      Mouth: Mucous membranes are moist.      Pharynx: No oropharyngeal exudate.   Eyes:      Extraocular Movements: Extraocular movements intact.      Conjunctiva/sclera: Conjunctivae normal.      Pupils: Pupils are equal, round, and reactive to light.   Cardiovascular:      Rate and Rhythm: Normal rate and regular rhythm.   Pulmonary:      Effort: Pulmonary effort is normal. No respiratory distress.      Breath sounds: Normal breath sounds.   Abdominal:      General: Abdomen is flat. Bowel sounds are normal.      Palpations: Abdomen is soft.      Tenderness: There is no abdominal tenderness.   Musculoskeletal:         General: No swelling.      Cervical back: Neck supple.      Right lower leg: No  edema.      Left lower leg: No edema.   Skin:     Coloration: Skin is not jaundiced.      Findings: No rash.   Neurological:      General: No focal deficit present.      Mental Status: She is alert and oriented to person, place, and time.      Cranial Nerves: No cranial nerve deficit.   Psychiatric:         Mood and Affect: Mood normal.         Behavior: Behavior normal.         Thought Content: Thought content normal.         Procedures    Results:       Assessment / Plan      Assessment/Plan:   Diagnoses and all orders for this visit:    1. Benign essential HTN (Primary)  -     amLODIPine (NORVASC) 5 MG tablet; Take 1 tablet by mouth Daily.  Dispense: 90 tablet; Refill: 3    2. Chronic cough  -     XR Chest PA & Lateral; Future  -     benzonatate (Tessalon Perles) 100 MG capsule; Take 1 capsule by mouth 3 (Three) Times a Day As Needed for Cough.  Dispense: 60 capsule; Refill: 3         Patient likely with lisinopril induced cough.  Has been off of this since early May, however can have persistent symptoms up to 3 months after discontinuation.  She was placed on valsartan which has much less risk of increased bradykinin and cough reflex, however still does have some risk with this.  We will discontinue this to eliminate any further potential etiologies of the cough.  Change her to amlodipine.  Watch out for swelling in the lower extremities with this medication.  Get x-ray to rule out any underlying infection or structural abnormalities.  Use Tessalon Perles for symptomatic relief.       Follow Up:   No follow-ups on file.    Ruperto Bolanos DO    [facility] [place] [facility]

## 2025-07-09 ENCOUNTER — TELEMEDICINE (OUTPATIENT)
Dept: PSYCHIATRY | Facility: CLINIC | Age: 55
End: 2025-07-09
Payer: COMMERCIAL

## 2025-07-09 DIAGNOSIS — F43.29 GRIEF REACTION WITH PROLONGED BEREAVEMENT: ICD-10-CM

## 2025-07-09 DIAGNOSIS — F41.1 GENERALIZED ANXIETY DISORDER: Primary | ICD-10-CM

## 2025-07-09 NOTE — PROGRESS NOTES
Dali Batista is a 55 y.o. female presenting to [facility] (through [facility]), [address], [address] using a secure [employer] Video Visit through [employer] for assessment with Radha Figueroa LCSW. Patient is being seen via telehealth and stated they are in a secure environment for this session. The patient's condition being diagnosed/treated is appropriate for telemedicine. The provider identified self as well as credentials. The patient, and/or patients guardian, consent to be seen remotely, and when consent is given they understand that the consent allows for patient identifiable information to be sent to a third party as needed. They may refuse to be seen remotely at any time. The electronic data is encrypted and password protected, and the patient and/or guardian has been advised of the potential risks to privacy not withstanding such measures.    You have chosen to receive care through a telehealth visit. Do you consent to use a video/audio connection for your medical care today?   Yes    Time In: 12:06  Time Out: 12:22    Chief Complaint: Anxiety / Grief      Progress Note / Session Description: Patient expresses feelings of excitement after recently moving out of home with sister and into more independent living.  States they are enjoying this environment and expresses feelings of excitement related to this change.  Patient states they did not complete training program out of town but are looking forward to seeking employment if possible.  Patient states they are enjoying social time and connection with new neighbors and residence at new living situation.  Patient expresses feelings of speedy and excitement related to spending time with sister and family in the coming weeks.  Majority of session spent with therapist asking patient questions about stability in managing overall mental health.  Patient was receptive and engaged, reports benefit  to consistent check-in/care.  Scheduled to return in 6 weeks.    Clinical Intervention / Modalities Utilized: Emotional processing, humanistic therapy    Assessment:  Patient appears to be maintaining stability and continues to be engaged in treatment. Patient is making reasonable progress towards goals but continues to struggle with symptoms of anxiety and complicated grief which impact functioning across life domains. As a result, patient can be expected to continue benefiting from treatment and would likely be at an increased risk of decompensation otherwise.     Diagnosis:     ICD-10-CM ICD-9-CM   1. 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        Mental Status Exam:   MENTAL STATUS EXAM   Attitude:  Cooperative  Speech:  Normal rate, tone, volume  Mood and affect:  Normal, pleasant  Hopelessness:  Denies  Loneliness: Denies  Thought Process:  Logical  Associations/ Thought Content:  No delusions  Hallucinations:  None  Suicidal Ideations:  Not present  Homicidal Ideation:  Not present  Sensorium:  Alert and clear  Orientation:  Person, place, time and situation  Immediate Recall, Recent, and Remote Memory:  Intact  Attention Span/ Concentration:  Easily distracted  Fund of Knowledge:  Limited  Intellectual Functioning:  Previous IDD dx  Insight:  Limited  Judgement:  Limited  Reliability:  Good  Impulse Control:  Good       Functional Status: Mild impairment     Prognosis: Good with Ongoing Treatment     Plan:   Patient will continue in individual outpatient therapy with focus on improved functioning and coping skills, maintaining stability, and avoiding decompensation and the need for higher level of care.     Patient will contact this office ([facility] at [phone]), call 911 or present to the nearest emergency room should suicidal or homicidal ideations occur. Provide Cognitive Behavioral Therapy and Solution  Focused Therapy to improve functioning, maintain stability, and avoid decompensation and the need for higher level of care.      Return onFOLLOWUP@ , or earlier if symptoms worsen or fail to improve.        This document has been electronically signed by Radha Figueroa LCSW  July 9, 2025 12:21 EDT    Dictated Utilizing Dragon Dictation: Part of this note may be an electronic transcription/translation of spoken language to printed text using the Dragon Dictation System.

## 2025-07-12 ENCOUNTER — HOSPITAL ENCOUNTER (OUTPATIENT)
Facility: HOSPITAL | Age: 55
Discharge: HOME | End: 2025-07-12
Payer: COMMERCIAL

## 2025-07-12 DIAGNOSIS — R05.3 CHRONIC COUGH: ICD-10-CM

## 2025-07-12 PROCEDURE — 71046 X-RAY EXAM CHEST 2 VIEWS: CPT

## 2025-07-16 ENCOUNTER — TELEPHONE (OUTPATIENT)
Dept: INTERNAL MEDICINE | Age: 55
End: 2025-07-16
Payer: COMMERCIAL

## 2025-07-16 DIAGNOSIS — I10 BENIGN ESSENTIAL HTN: ICD-10-CM

## 2025-07-16 RX ORDER — AMLODIPINE BESYLATE 5 MG/1
10 TABLET ORAL DAILY
Start: 2025-07-16

## 2025-07-16 NOTE — TELEPHONE ENCOUNTER
Sister of pt called and wanted to speak with dr ventura about pt bp readings that she sent through my chart on 07/12/2025. Please call sister of pt back at [phone]